# Patient Record
Sex: MALE | Race: WHITE | NOT HISPANIC OR LATINO | Employment: OTHER | RURAL
[De-identification: names, ages, dates, MRNs, and addresses within clinical notes are randomized per-mention and may not be internally consistent; named-entity substitution may affect disease eponyms.]

---

## 2021-07-03 ENCOUNTER — OFFICE VISIT (OUTPATIENT)
Dept: FAMILY MEDICINE | Facility: CLINIC | Age: 71
End: 2021-07-03
Payer: MEDICARE

## 2021-07-03 VITALS
TEMPERATURE: 97 F | OXYGEN SATURATION: 95 % | HEART RATE: 54 BPM | SYSTOLIC BLOOD PRESSURE: 159 MMHG | DIASTOLIC BLOOD PRESSURE: 89 MMHG | HEIGHT: 67 IN | BODY MASS INDEX: 28.25 KG/M2 | WEIGHT: 180 LBS | RESPIRATION RATE: 18 BRPM

## 2021-07-03 DIAGNOSIS — J20.9 ACUTE BRONCHITIS, UNSPECIFIED ORGANISM: Primary | ICD-10-CM

## 2021-07-03 PROCEDURE — 99213 OFFICE O/P EST LOW 20 MIN: CPT | Mod: 25,,, | Performed by: FAMILY MEDICINE

## 2021-07-03 PROCEDURE — 96372 PR INJECTION,THERAP/PROPH/DIAG2ST, IM OR SUBCUT: ICD-10-PCS | Mod: ,,, | Performed by: FAMILY MEDICINE

## 2021-07-03 PROCEDURE — 99051 PR MEDICAL SERVICES, EVE/WKEND/HOLIDAY: ICD-10-PCS | Mod: ,,, | Performed by: FAMILY MEDICINE

## 2021-07-03 PROCEDURE — 99051 MED SERV EVE/WKEND/HOLIDAY: CPT | Mod: ,,, | Performed by: FAMILY MEDICINE

## 2021-07-03 PROCEDURE — 96372 THER/PROPH/DIAG INJ SC/IM: CPT | Mod: ,,, | Performed by: FAMILY MEDICINE

## 2021-07-03 PROCEDURE — 99213 PR OFFICE/OUTPT VISIT, EST, LEVL III, 20-29 MIN: ICD-10-PCS | Mod: 25,,, | Performed by: FAMILY MEDICINE

## 2021-07-03 RX ORDER — PREDNISONE 20 MG/1
TABLET ORAL
Qty: 10 TABLET | Refills: 0 | Status: SHIPPED | OUTPATIENT
Start: 2021-07-03 | End: 2022-09-08

## 2021-07-03 RX ORDER — AZITHROMYCIN 250 MG/1
TABLET, FILM COATED ORAL
Qty: 6 TABLET | Refills: 0 | Status: SHIPPED | OUTPATIENT
Start: 2021-07-03 | End: 2022-09-08

## 2021-07-03 RX ORDER — DEXAMETHASONE SODIUM PHOSPHATE 4 MG/ML
6 INJECTION, SOLUTION INTRA-ARTICULAR; INTRALESIONAL; INTRAMUSCULAR; INTRAVENOUS; SOFT TISSUE
Status: COMPLETED | OUTPATIENT
Start: 2021-07-03 | End: 2021-07-03

## 2021-07-03 RX ADMIN — DEXAMETHASONE SODIUM PHOSPHATE 6 MG: 4 INJECTION, SOLUTION INTRA-ARTICULAR; INTRALESIONAL; INTRAMUSCULAR; INTRAVENOUS; SOFT TISSUE at 10:07

## 2021-07-07 ENCOUNTER — HOSPITAL ENCOUNTER (OUTPATIENT)
Dept: RADIOLOGY | Facility: HOSPITAL | Age: 71
Discharge: HOME | End: 2021-07-07
Attending: INTERNAL MEDICINE
Payer: MEDICARE

## 2021-07-07 ENCOUNTER — OFFICE VISIT (OUTPATIENT)
Dept: INTERNAL MEDICINE | Facility: CLINIC | Age: 71
End: 2021-07-07
Payer: MEDICARE

## 2021-07-07 VITALS
BODY MASS INDEX: 26.51 KG/M2 | DIASTOLIC BLOOD PRESSURE: 84 MMHG | HEIGHT: 69 IN | RESPIRATION RATE: 18 BRPM | SYSTOLIC BLOOD PRESSURE: 126 MMHG | HEART RATE: 51 BPM | WEIGHT: 179 LBS | OXYGEN SATURATION: 94 %

## 2021-07-07 DIAGNOSIS — R05.9 COUGH: ICD-10-CM

## 2021-07-07 DIAGNOSIS — Z76.89 ENCOUNTER TO ESTABLISH CARE WITH NEW DOCTOR: Primary | ICD-10-CM

## 2021-07-07 DIAGNOSIS — R06.2 WHEEZING: ICD-10-CM

## 2021-07-07 DIAGNOSIS — K21.9 GASTROESOPHAGEAL REFLUX DISEASE, UNSPECIFIED WHETHER ESOPHAGITIS PRESENT: ICD-10-CM

## 2021-07-07 DIAGNOSIS — R68.83 CHILLS: ICD-10-CM

## 2021-07-07 PROCEDURE — 99204 PR OFFICE/OUTPT VISIT, NEW, LEVL IV, 45-59 MIN: ICD-10-PCS | Mod: S$PBB,,, | Performed by: INTERNAL MEDICINE

## 2021-07-07 PROCEDURE — 99204 OFFICE O/P NEW MOD 45 MIN: CPT | Mod: S$PBB,,, | Performed by: INTERNAL MEDICINE

## 2021-07-07 PROCEDURE — 71046 XR CHEST PA AND LATERAL: ICD-10-PCS | Mod: 26,,, | Performed by: RADIOLOGY

## 2021-07-07 PROCEDURE — 71046 X-RAY EXAM CHEST 2 VIEWS: CPT | Mod: 26,,, | Performed by: RADIOLOGY

## 2021-07-07 PROCEDURE — 71046 X-RAY EXAM CHEST 2 VIEWS: CPT | Mod: TC

## 2021-07-07 PROCEDURE — 99214 OFFICE O/P EST MOD 30 MIN: CPT | Mod: PBBFAC,25 | Performed by: INTERNAL MEDICINE

## 2021-07-07 RX ORDER — HYDROCODONE POLISTIREX AND CHLORPHENIRAMINE POLISTIREX 10; 8 MG/5ML; MG/5ML
5 SUSPENSION, EXTENDED RELEASE ORAL EVERY 12 HOURS PRN
Qty: 115 ML | Refills: 0 | Status: SHIPPED | OUTPATIENT
Start: 2021-07-07 | End: 2022-09-08

## 2021-07-07 RX ORDER — ESOMEPRAZOLE MAGNESIUM 40 MG/1
40 CAPSULE, DELAYED RELEASE ORAL
Qty: 90 CAPSULE | Refills: 3 | Status: SHIPPED | OUTPATIENT
Start: 2021-07-07 | End: 2022-07-07

## 2021-07-07 RX ORDER — ALBUTEROL SULFATE 90 UG/1
2 AEROSOL, METERED RESPIRATORY (INHALATION) EVERY 6 HOURS PRN
Qty: 18 G | Refills: 1 | Status: SHIPPED | OUTPATIENT
Start: 2021-07-07 | End: 2022-09-08

## 2021-07-08 DIAGNOSIS — Z11.9 SCREENING EXAMINATION FOR INFECTIOUS DISEASE: Primary | ICD-10-CM

## 2021-08-30 ENCOUNTER — OFFICE VISIT (OUTPATIENT)
Dept: INTERNAL MEDICINE | Facility: CLINIC | Age: 71
End: 2021-08-30
Payer: MEDICARE

## 2021-08-30 VITALS
WEIGHT: 186 LBS | HEART RATE: 52 BPM | BODY MASS INDEX: 27.55 KG/M2 | SYSTOLIC BLOOD PRESSURE: 158 MMHG | OXYGEN SATURATION: 97 % | DIASTOLIC BLOOD PRESSURE: 80 MMHG | HEIGHT: 69 IN | RESPIRATION RATE: 16 BRPM

## 2021-08-30 DIAGNOSIS — H91.90 HEARING LOSS, UNSPECIFIED HEARING LOSS TYPE, UNSPECIFIED LATERALITY: ICD-10-CM

## 2021-08-30 DIAGNOSIS — N40.0 BENIGN PROSTATIC HYPERPLASIA, UNSPECIFIED WHETHER LOWER URINARY TRACT SYMPTOMS PRESENT: ICD-10-CM

## 2021-08-30 DIAGNOSIS — R03.0 ELEVATED BLOOD PRESSURE READING: ICD-10-CM

## 2021-08-30 DIAGNOSIS — Z09 FOLLOW UP: Primary | ICD-10-CM

## 2021-08-30 PROCEDURE — 99214 OFFICE O/P EST MOD 30 MIN: CPT | Mod: S$PBB,,, | Performed by: INTERNAL MEDICINE

## 2021-08-30 PROCEDURE — 99215 OFFICE O/P EST HI 40 MIN: CPT | Mod: PBBFAC | Performed by: INTERNAL MEDICINE

## 2021-08-30 PROCEDURE — 99214 PR OFFICE/OUTPT VISIT, EST, LEVL IV, 30-39 MIN: ICD-10-PCS | Mod: S$PBB,,, | Performed by: INTERNAL MEDICINE

## 2021-10-20 ENCOUNTER — OFFICE VISIT (OUTPATIENT)
Dept: OTOLARYNGOLOGY | Facility: CLINIC | Age: 71
End: 2021-10-20
Payer: MEDICARE

## 2021-10-20 ENCOUNTER — CLINICAL SUPPORT (OUTPATIENT)
Dept: AUDIOLOGY | Facility: CLINIC | Age: 71
End: 2021-10-20
Payer: MEDICARE

## 2021-10-20 VITALS — HEIGHT: 69 IN | BODY MASS INDEX: 27.55 KG/M2 | WEIGHT: 186 LBS

## 2021-10-20 DIAGNOSIS — H90.3 SENSORINEURAL HEARING LOSS (SNHL) OF BOTH EARS: Primary | ICD-10-CM

## 2021-10-20 DIAGNOSIS — H91.90 HEARING LOSS, UNSPECIFIED HEARING LOSS TYPE, UNSPECIFIED LATERALITY: ICD-10-CM

## 2021-10-20 PROCEDURE — 92557 COMPREHENSIVE HEARING TEST: CPT | Mod: PBBFAC | Performed by: AUDIOLOGIST

## 2021-10-20 PROCEDURE — 99212 OFFICE O/P EST SF 10 MIN: CPT | Mod: PBBFAC | Performed by: AUDIOLOGIST

## 2021-10-20 PROCEDURE — 99204 OFFICE O/P NEW MOD 45 MIN: CPT | Mod: S$PBB,,, | Performed by: OTOLARYNGOLOGY

## 2021-10-20 PROCEDURE — 99204 PR OFFICE/OUTPT VISIT, NEW, LEVL IV, 45-59 MIN: ICD-10-PCS | Mod: S$PBB,,, | Performed by: OTOLARYNGOLOGY

## 2021-10-20 PROCEDURE — 99213 OFFICE O/P EST LOW 20 MIN: CPT | Mod: PBBFAC | Performed by: OTOLARYNGOLOGY

## 2021-11-15 ENCOUNTER — OFFICE VISIT (OUTPATIENT)
Dept: UROLOGY | Facility: CLINIC | Age: 71
End: 2021-11-15
Payer: MEDICARE

## 2021-11-15 VITALS
WEIGHT: 190 LBS | BODY MASS INDEX: 28.14 KG/M2 | SYSTOLIC BLOOD PRESSURE: 139 MMHG | DIASTOLIC BLOOD PRESSURE: 84 MMHG | HEIGHT: 69 IN | HEART RATE: 67 BPM

## 2021-11-15 DIAGNOSIS — R97.20 ELEVATED PSA: Primary | ICD-10-CM

## 2021-11-15 DIAGNOSIS — N41.1 CHRONIC PROSTATITIS: ICD-10-CM

## 2021-11-15 DIAGNOSIS — N40.1 BENIGN PROSTATIC HYPERPLASIA WITH URINARY OBSTRUCTION: ICD-10-CM

## 2021-11-15 DIAGNOSIS — R97.20 ELEVATED PROSTATE SPECIFIC ANTIGEN (PSA): Primary | ICD-10-CM

## 2021-11-15 DIAGNOSIS — N32.0 BLADDER NECK OBSTRUCTION: ICD-10-CM

## 2021-11-15 DIAGNOSIS — N13.8 BENIGN PROSTATIC HYPERPLASIA WITH URINARY OBSTRUCTION: ICD-10-CM

## 2021-11-15 PROCEDURE — 99213 OFFICE O/P EST LOW 20 MIN: CPT | Mod: S$PBB,,, | Performed by: UROLOGY

## 2021-11-15 PROCEDURE — 99213 OFFICE O/P EST LOW 20 MIN: CPT | Mod: PBBFAC | Performed by: UROLOGY

## 2021-11-15 PROCEDURE — 99213 PR OFFICE/OUTPT VISIT, EST, LEVL III, 20-29 MIN: ICD-10-PCS | Mod: S$PBB,,, | Performed by: UROLOGY

## 2021-11-18 DIAGNOSIS — R97.20 ELEVATED PSA: Primary | ICD-10-CM

## 2021-11-18 DIAGNOSIS — Z01.812 PRE-PROCEDURE LAB EXAM: ICD-10-CM

## 2021-12-09 ENCOUNTER — OFFICE VISIT (OUTPATIENT)
Dept: UROLOGY | Facility: CLINIC | Age: 71
End: 2021-12-09
Payer: MEDICARE

## 2021-12-09 VITALS
SYSTOLIC BLOOD PRESSURE: 159 MMHG | DIASTOLIC BLOOD PRESSURE: 84 MMHG | WEIGHT: 188 LBS | BODY MASS INDEX: 27.85 KG/M2 | HEART RATE: 55 BPM | HEIGHT: 69 IN

## 2021-12-09 DIAGNOSIS — N13.8 BENIGN PROSTATIC HYPERPLASIA WITH URINARY OBSTRUCTION: ICD-10-CM

## 2021-12-09 DIAGNOSIS — N41.1 CHRONIC PROSTATITIS: ICD-10-CM

## 2021-12-09 DIAGNOSIS — R97.20 ELEVATED PSA: Primary | ICD-10-CM

## 2021-12-09 DIAGNOSIS — N40.1 BENIGN PROSTATIC HYPERPLASIA WITH URINARY OBSTRUCTION: ICD-10-CM

## 2021-12-09 PROCEDURE — 99211 PR OFFICE/OUTPT VISIT, EST, LEVL I: ICD-10-PCS | Mod: S$PBB,,, | Performed by: UROLOGY

## 2021-12-09 PROCEDURE — 99211 OFF/OP EST MAY X REQ PHY/QHP: CPT | Mod: S$PBB,,, | Performed by: UROLOGY

## 2021-12-09 PROCEDURE — 99214 OFFICE O/P EST MOD 30 MIN: CPT | Mod: PBBFAC | Performed by: UROLOGY

## 2021-12-14 ENCOUNTER — HOSPITAL ENCOUNTER (OUTPATIENT)
Dept: RADIOLOGY | Facility: HOSPITAL | Age: 71
Discharge: HOME | End: 2021-12-14
Attending: ORTHOPAEDIC SURGERY
Payer: MEDICARE

## 2021-12-14 DIAGNOSIS — M79.641 PAIN IN BOTH HANDS: ICD-10-CM

## 2021-12-14 DIAGNOSIS — M79.642 PAIN IN BOTH HANDS: ICD-10-CM

## 2021-12-14 PROCEDURE — 73130 X-RAY EXAM OF HAND: CPT | Mod: TC,50

## 2022-03-11 DIAGNOSIS — Z71.89 COMPLEX CARE COORDINATION: ICD-10-CM

## 2022-06-13 ENCOUNTER — OFFICE VISIT (OUTPATIENT)
Dept: UROLOGY | Facility: CLINIC | Age: 72
End: 2022-06-13
Payer: MEDICARE

## 2022-06-13 VITALS
WEIGHT: 190 LBS | BODY MASS INDEX: 28.14 KG/M2 | HEART RATE: 51 BPM | SYSTOLIC BLOOD PRESSURE: 160 MMHG | DIASTOLIC BLOOD PRESSURE: 78 MMHG | HEIGHT: 69 IN

## 2022-06-13 DIAGNOSIS — R97.20 ELEVATED PSA: Primary | ICD-10-CM

## 2022-06-13 DIAGNOSIS — N41.1 CHRONIC PROSTATITIS: ICD-10-CM

## 2022-06-13 DIAGNOSIS — N40.1 BENIGN PROSTATIC HYPERPLASIA WITH URINARY OBSTRUCTION: ICD-10-CM

## 2022-06-13 DIAGNOSIS — N32.0 BLADDER NECK OBSTRUCTION: ICD-10-CM

## 2022-06-13 DIAGNOSIS — N13.8 BENIGN PROSTATIC HYPERPLASIA WITH URINARY OBSTRUCTION: ICD-10-CM

## 2022-06-13 DIAGNOSIS — Z77.098 EXPOSURE TO HAZARDOUS CHEMICAL: ICD-10-CM

## 2022-06-13 PROCEDURE — 99213 PR OFFICE/OUTPT VISIT, EST, LEVL III, 20-29 MIN: ICD-10-PCS | Mod: S$PBB,,, | Performed by: UROLOGY

## 2022-06-13 PROCEDURE — 99213 OFFICE O/P EST LOW 20 MIN: CPT | Mod: S$PBB,,, | Performed by: UROLOGY

## 2022-06-13 PROCEDURE — 99214 OFFICE O/P EST MOD 30 MIN: CPT | Mod: PBBFAC | Performed by: UROLOGY

## 2022-06-13 RX ORDER — IBUPROFEN 400 MG/1
400 TABLET ORAL NIGHTLY PRN
COMMUNITY

## 2022-06-13 RX ORDER — DIPHENHYDRAMINE HCL 25 MG
25 TABLET ORAL NIGHTLY PRN
COMMUNITY

## 2022-06-13 NOTE — PROGRESS NOTES
Subjective:       Patient ID: Praful Esposito III is a 72 y.o. male.    Chief Complaint: Follow-up (6 month follow up, PSA elevation)      History of Present Illness  Mr. Esposito is now 64 years old. He is seen for the first time as a clinic patient in nearly 5 years. He has been seen at yearly screens and has had PSAs done virtually every year. He has a relatively high normal PSA. He has had some increase in lower tract obstructive symptoms. He does not void as well compared to what he used to. He has not on any regular medications and does not feel he needs anything yet to help with the urine flow. No new health issues that he is aware of but he has not had any blood work in several years and desires general check up also. Does not have primary physician currently but has used Dr. Gonzalez in the past.  (April 1, 2015)     Mr. Esposito's PSA has continued to go up. It was 4.71 at recent prostate screening in September. We had recommended that he have prostate biopsies and he comes in today to have these done. Clinically unchanged. He does have mild-to-moderate lower tract obstructive symptoms.  (November 19 2015)     Mr. Esposito is in for follow-up of PSA elevation. 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 Prostate size was 50 mL. He has not been having a good stream and has trouble emptying and has slowing of his stream. He desires no medication to help with that at this time. He has had a good 6 months overall. (July 26, 2016)     Mr. Esposito is in for his 6 month follow-up for PSA elevation. Biopsies slightly over a year ago did not show any suggestion of cancer but did have high-grade PIN and continued follow-up indicated. He is doing okay clinically with no worsening bladder outlet obstructive symptoms. PSA is down slightly to 4.460.  (January 31, 2017)     Mr. Esposito is in for 6 month follow-up of chronic PSA elevation. Clinically doing okay with no worsening  bladder outlet obstructive symptoms or other difficulty. (August 1, 2017)     Mr. Esposito had his MRI of the prostate done on September 12, 2017. The size of the gland was 61 mL. The patient had  no suspicious lesions identified and no biopsy was felt to be indicated. Returned today for follow-up PSA and recheck. He's had no significant increase in voiding symptoms with relatively mild bladder outlet obstructive problems. No new health issues. Recently retired from [employer]. (February 6, 2018)     Mr. Esposito is in for his 6 month follow-up for PSA elevation. Clinically doing okay without any worsening bladder outlet obstructive symptoms. Typically nocturia x1. He takes no medications. Overall satisfactory 6 months. (August 7, 2018)     Mr. Esposito is in for first time in a year. He missed his last appointment because he was getting over back surgery that he had in December. He had that done in [facility]. He is doing fairly well with voiding with some slight hesitancy and usually nocturia x1. He does not want any pharmacologic help with the way he is voiding. (October 31, 2019)     Mr. Esposito is in for first visit in a year. He has had chronic PSA elevation for several years. He's had previous biopsies and had previous MRI of the prostate done at [facility]. Clinically doing okay with nocturia one to 2 times nightly. He's had no new health problems this year feels he is stable clinically. (November 9, 2020)     PSA was 7.510 on 12/14/2020  PSA was 7.810 on November 9, 2020  PSA was 5.740 on October 31, 2019   PSA was 6.280 on  August 7, 2018  PSA was 5.050 on February 6, 2018  PSA was 6.650 on August 1, 2017  PSA was 4.460 on 1/31/2017  PSA was 4.970 on July 26, 2016  PSA was 4.71 on September 22, 2015  PSA was 3.630 on April 1, 2015, Past PSA Results   PSA was 2.89 on September 13, 2011  2.03 on September 22, 2009  2.54 on September 23, 2008  1.88 on September 18, 2007  2.59 on September 19, 2006  1.92 on September  20, 2005  1.3 on September 16, 2003  1.5 on September 27, 2000  -------------------------------------------------------------------------------------------------------------------------------------------------------------------------------------------------------------------  -The above notes are from the old electronic medical record--------------------------------------------------------------------     PSA was 9.130 on November 16, 2021  PSA was 8.640 on June 13, 2022     Mr. Esposito is in for 1st visit in a year.  He has chronic PSA elevation.  PSA pending while he was in the office.  He feels he is stable with no change with nocturia only 1 time nightly.  He has been followed for PSA elevation for over 10 years.  No worsening bladder outlet obstructive symptoms.  Typically nocturia only 1 time nightly like he has had for the last few years.  He had a MRI of prostate at [facility] in September 2017. Had biopsies done in 2015. General health has been good and he has had no other health issues. (November 15, 2021)          Mr. Esposito  was in for his MRI of the prostate and follow-up of his PSA elevation that had actually gone to the highest level it has ever been on his PSA done last month.  No worsening bladder outlet obstructive symptoms. (December 9, 2021)     Mr. Esposito is in for his 6 month follow-up of chronic PSA elevation.  He feels he is stable with voiding with nocturia x1 on no medications.  MRI was a PI-RADS 2 with 57 mL prostate.  He does not feel he needs any help with voiding at present and has had a good 6 months.   PSA drawn and pending.  (June 13, 2022)      Review of Systems      Objective:      Physical Exam  Constitutional:       Appearance: Normal appearance. He is normal weight.   Genitourinary:     Prostate: Normal.      Rectum: Normal.      Comments: Prostate gland feels 30-40 g smooth symmetrical and not suspicious  Neurological:      Mental Status: He is alert.   Psychiatric:         Mood and  Affect: Mood normal.         Behavior: Behavior normal.       urinalysis unremarkable with occasional pus cells.  Dipstick has a trace of blood pH 5.0 and specific gravity 1.020  Assessment:       Problem List Items Addressed This Visit        Renal/    Benign prostatic hyperplasia      Other Visit Diagnoses     Elevated PSA    -  Primary    Relevant Orders    PSA, Total (Diagnostic)    Exposure to hazardous chemical        Relevant Orders    Testosterone    Estradiol    Chronic prostatitis        Bladder neck obstruction              Plan:     Mr. Esposito desires no help with the way he is voiding currently.  PSA returned after he left and is down slightly to 8.640.  He was notified of results by telephone.  Six month appointment with PSA or sooner for problems.  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  *

## 2022-06-14 NOTE — PATIENT INSTRUCTIONS
Mr. Esposito desires no help with the way he is voiding currently.  PSA returned after he left and is down slightly to 8.640.  He was notified of results by telephone.  Six month appointment with PSA or sooner for problems.  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

## 2022-07-31 ENCOUNTER — EXTERNAL CHRONIC CARE MANAGEMENT (OUTPATIENT)
Dept: INTERNAL MEDICINE | Facility: CLINIC | Age: 72
End: 2022-07-31
Payer: MEDICARE

## 2022-07-31 PROCEDURE — 99490 CHRNC CARE MGMT STAFF 1ST 20: CPT | Mod: S$PBB,,, | Performed by: INTERNAL MEDICINE

## 2022-07-31 PROCEDURE — 99490 PR CHRONIC CARE MGMT, 1ST 20 MIN: ICD-10-PCS | Mod: S$PBB,,, | Performed by: INTERNAL MEDICINE

## 2022-07-31 PROCEDURE — 99490 CHRNC CARE MGMT STAFF 1ST 20: CPT | Mod: PBBFAC | Performed by: INTERNAL MEDICINE

## 2022-08-31 ENCOUNTER — EXTERNAL CHRONIC CARE MANAGEMENT (OUTPATIENT)
Dept: INTERNAL MEDICINE | Facility: CLINIC | Age: 72
End: 2022-08-31
Payer: MEDICARE

## 2022-08-31 PROCEDURE — 99490 CHRNC CARE MGMT STAFF 1ST 20: CPT | Mod: PBBFAC | Performed by: INTERNAL MEDICINE

## 2022-08-31 PROCEDURE — 99490 CHRNC CARE MGMT STAFF 1ST 20: CPT | Mod: S$PBB,,, | Performed by: INTERNAL MEDICINE

## 2022-08-31 PROCEDURE — 99490 PR CHRONIC CARE MGMT, 1ST 20 MIN: ICD-10-PCS | Mod: S$PBB,,, | Performed by: INTERNAL MEDICINE

## 2022-09-08 ENCOUNTER — OFFICE VISIT (OUTPATIENT)
Dept: INTERNAL MEDICINE | Facility: CLINIC | Age: 72
End: 2022-09-08
Payer: MEDICARE

## 2022-09-08 VITALS
DIASTOLIC BLOOD PRESSURE: 70 MMHG | HEIGHT: 69 IN | OXYGEN SATURATION: 98 % | WEIGHT: 186 LBS | SYSTOLIC BLOOD PRESSURE: 160 MMHG | HEART RATE: 61 BPM | BODY MASS INDEX: 27.55 KG/M2 | RESPIRATION RATE: 20 BRPM

## 2022-09-08 DIAGNOSIS — Z09 FOLLOW UP: Primary | ICD-10-CM

## 2022-09-08 DIAGNOSIS — R03.0 ELEVATED BLOOD PRESSURE READING: ICD-10-CM

## 2022-09-08 DIAGNOSIS — N40.0 BENIGN PROSTATIC HYPERPLASIA, UNSPECIFIED WHETHER LOWER URINARY TRACT SYMPTOMS PRESENT: ICD-10-CM

## 2022-09-08 DIAGNOSIS — H91.90 HEARING LOSS, UNSPECIFIED HEARING LOSS TYPE, UNSPECIFIED LATERALITY: ICD-10-CM

## 2022-09-08 DIAGNOSIS — I10 ESSENTIAL (PRIMARY) HYPERTENSION: ICD-10-CM

## 2022-09-08 DIAGNOSIS — K21.9 GASTROESOPHAGEAL REFLUX DISEASE, UNSPECIFIED WHETHER ESOPHAGITIS PRESENT: ICD-10-CM

## 2022-09-08 PROCEDURE — 99214 PR OFFICE/OUTPT VISIT, EST, LEVL IV, 30-39 MIN: ICD-10-PCS | Mod: S$PBB,,, | Performed by: INTERNAL MEDICINE

## 2022-09-08 PROCEDURE — 99214 OFFICE O/P EST MOD 30 MIN: CPT | Mod: S$PBB,,, | Performed by: INTERNAL MEDICINE

## 2022-09-08 PROCEDURE — 99214 OFFICE O/P EST MOD 30 MIN: CPT | Mod: PBBFAC | Performed by: INTERNAL MEDICINE

## 2022-09-08 RX ORDER — HYDROGEN PEROXIDE 3 %
20 SOLUTION, NON-ORAL MISCELLANEOUS
Qty: 90 CAPSULE | Refills: 3 | Status: SHIPPED | OUTPATIENT
Start: 2022-09-08 | End: 2022-09-08

## 2022-09-08 RX ORDER — DICLOFENAC SODIUM 30 MG/G
GEL TOPICAL 2 TIMES DAILY
COMMUNITY
End: 2023-09-11 | Stop reason: SDUPTHER

## 2022-09-08 RX ORDER — ESOMEPRAZOLE MAGNESIUM 40 MG/1
40 CAPSULE, DELAYED RELEASE ORAL
Qty: 90 CAPSULE | Refills: 3 | Status: SHIPPED | OUTPATIENT
Start: 2022-09-08 | End: 2023-03-02 | Stop reason: SDUPTHER

## 2022-09-08 NOTE — PROGRESS NOTES
Subjective:       Patient ID: Praful Esposito III is a 72 y.o. male.    Chief Complaint: Follow-up (C/o LISBETH leg pain and has been using voltaren gel. )    The patient is a 71-year-old white male the presents today for follow-up.  History of GERD and BPH.  At his last visit he complained of cough and wheezing.  Chest x-ray showed no acute process.  COVID swab was negative.  We prescribed him a Ventolin inhaler and his cough has resolved.  No complaints today.  His systolic blood pressure is  Elevated.  No history of hypertension.  He is currently resting comfortably in no distress.  He is afebrile and other than systolic blood pressure vital signs are stable.  He states systolic blood pressure normally runs in the 130s to 140s.    9/8-the patient presents today for follow-up.  No further issues with cough.  His blood pressure is elevated again today at 160/70.  We had him keep a blood pressure log last time and blood pressures were at target.  He forgot his blood pressure log that he was going to bring in with him today.  He states that systolics have been running in the 130s and 140s.  He complains of some bilateral groin pain that happened after doing some stretching.  No numbness or tingling.  States that he also had what looked like acne on his face that comes and goes.  He has an appointment with Dermatology in October.  Since it has been greater than 10 years since he has had a colonoscopy.  His father had colon cancer.  He also needs refill on his Nexium.  He is resting comfortably today in no distress.  Outside of the blood pressure his vital signs are stable.    Follow-up  Associated symptoms include arthralgias. Pertinent negatives include no abdominal pain, chest pain, chills, coughing, fatigue, fever, headaches, joint swelling, myalgias, nausea, neck pain, rash, sore throat, vomiting or weakness.   Review of Systems   Constitutional:  Negative for activity change, appetite change, chills, fatigue, fever and  unexpected weight change.   HENT:  Negative for ear pain, hearing loss, rhinorrhea, sinus pressure/congestion, sore throat and trouble swallowing.    Eyes:  Negative for pain, discharge, redness and visual disturbance.   Respiratory:  Negative for apnea, cough, chest tightness, shortness of breath and wheezing.    Cardiovascular:  Negative for chest pain, palpitations and leg swelling.   Gastrointestinal:  Negative for abdominal pain, blood in stool, constipation, diarrhea, nausea and vomiting.   Endocrine: Negative for cold intolerance, heat intolerance, polydipsia and polyuria.   Genitourinary:  Negative for difficulty urinating, dysuria, hematuria and urgency.   Musculoskeletal:  Positive for arthralgias. Negative for back pain, joint swelling, myalgias and neck pain.   Integumentary:  Negative for pallor and rash.   Allergic/Immunologic: Negative for frequent infections.   Neurological:  Negative for tremors, seizures, weakness and headaches.   Hematological:  Negative for adenopathy.   Psychiatric/Behavioral:  Negative for confusion, dysphoric mood and sleep disturbance. The patient is not nervous/anxious.        Objective:      Physical Exam  Vitals and nursing note reviewed.   Constitutional:       General: He is not in acute distress.     Appearance: Normal appearance. He is not ill-appearing.   HENT:      Head: Normocephalic and atraumatic.      Right Ear: External ear normal.      Left Ear: External ear normal.      Nose: Nose normal.      Mouth/Throat:      Pharynx: Oropharynx is clear.   Eyes:      Extraocular Movements: Extraocular movements intact.      Conjunctiva/sclera: Conjunctivae normal.      Pupils: Pupils are equal, round, and reactive to light.   Neck:      Vascular: No carotid bruit.   Cardiovascular:      Rate and Rhythm: Normal rate and regular rhythm.      Pulses: Normal pulses.      Heart sounds: Normal heart sounds. No murmur heard.  Pulmonary:      Effort: No respiratory distress.       Breath sounds: No wheezing or rales.   Abdominal:      General: Bowel sounds are normal. There is no distension.      Palpations: Abdomen is soft.   Musculoskeletal:         General: Normal range of motion.      Cervical back: Normal range of motion and neck supple.      Right lower leg: No edema.      Left lower leg: No edema.   Skin:     General: Skin is warm and dry.      Capillary Refill: Capillary refill takes less than 2 seconds.      Coloration: Skin is not pale.   Neurological:      General: No focal deficit present.      Mental Status: He is alert and oriented to person, place, and time.      Cranial Nerves: No cranial nerve deficit.      Sensory: No sensory deficit.      Motor: No weakness.   Psychiatric:         Mood and Affect: Mood normal.         Judgment: Judgment normal.       Assessment:       1. Follow up    2. Hearing loss, unspecified hearing loss type, unspecified laterality    3. Elevated blood pressure reading    4. Benign prostatic hyperplasia, unspecified whether lower urinary tract symptoms present    5. Gastroesophageal reflux disease, unspecified whether esophagitis present    6. Essential (primary) hypertension        Plan:       1. The patient presents today for follow-up.  We are going to check some routine lab work today.  We will also make a referral to GI for colonoscopy.  It has been greater than 10 years.  He has this bilateral groin pain following some stretching that he did.  He has a topical solution that he is using right now that helps.  I have offered does make a referral to physical therapy but he would like to hold off at this time.    2. Hearing loss-we will make a referral to audiology for a formal hearing test  9/8-no acute issues.  According to his wife he did have the testing done    3. Elevated blood pressure reading-no diagnosis hypertension.  Systolic blood pressure 160 today.  He states that it normally runs in the 130s to 140s.  His last blood pressure log shows  systolics in the 130s to 140s.  I think he likely has a component of white coat hypertension.  He left his most recent blood pressure log at home but will get that back to us.    4. BPH-stable.  PSA 8.64 down from 9.13.  He follows with Urology    5. GERD-stable on Nexium    6. Rash-comes and goes.  On the face.  Rosacea?  He has an appointment scheduled with Dermatology in October.    Return to care in 6 months or sooner if needed

## 2022-09-22 ENCOUNTER — TELEPHONE (OUTPATIENT)
Dept: INTERNAL MEDICINE | Facility: CLINIC | Age: 72
End: 2022-09-22
Payer: MEDICARE

## 2022-09-22 DIAGNOSIS — M25.521 RIGHT ELBOW PAIN: ICD-10-CM

## 2022-09-22 DIAGNOSIS — M79.606 PAIN OF LOWER EXTREMITY, UNSPECIFIED LATERALITY: ICD-10-CM

## 2022-09-22 DIAGNOSIS — M79.641 PAIN OF RIGHT HAND: Primary | ICD-10-CM

## 2022-09-22 DIAGNOSIS — R20.0 NUMBNESS OF FINGER: ICD-10-CM

## 2022-09-22 NOTE — TELEPHONE ENCOUNTER
Pt called with c/o right elbow pain and finger going numb, and having to use two hands to open a drink etc. After speaking with Dr. De La Rosa we are referring pt to ortho. Pt was advised to take ibuprofen as needed for pain. He verbalized understanding.

## 2022-10-06 ENCOUNTER — HOSPITAL ENCOUNTER (OUTPATIENT)
Dept: RADIOLOGY | Facility: HOSPITAL | Age: 72
Discharge: HOME | End: 2022-10-06
Attending: ORTHOPAEDIC SURGERY
Payer: MEDICARE

## 2022-10-06 DIAGNOSIS — M79.641 HAND PAIN, RIGHT: ICD-10-CM

## 2022-10-06 PROCEDURE — 73130 X-RAY EXAM OF HAND: CPT | Mod: TC,RT

## 2022-10-06 NOTE — H&P (VIEW-ONLY)
CC:   Chief Complaint   Patient presents with    Right Hand - Pain, Numbness     REF BY DR. EASON        PREVIOUS INFO:        HISTORY:   10/6/2022    Praful Esposito III  is a 72 y.o. comes in for least a month having significant right arm numbness and tingling pretty much all the time we tries to sleep particular goes to sleep driving he has had symptoms longer than that but it has been worse recently the right arm was really affected he does have some left hand symptoms he denies neck pain he denies a trauma      PAST MEDICAL HISTORY:   Past Medical History:   Diagnosis Date    Acute bronchitis     Benign prostatic hyperplasia with lower urinary tract symptoms     Bladder neck obstruction     Chronic prostatitis     Dysplasia of prostate     Elevated PSA     Fatigue     Urinary tract infection           PAST SURGICAL HISTORY:   Past Surgical History:   Procedure Laterality Date    BACK SURGERY      TONSILLECTOMY      TRUS Biopsy of prostate            ALLERGIES: Review of patient's allergies indicates:  No Known Allergies     MEDICATIONS :    Current Outpatient Medications:     diclofenac sodium (SOLARAZE) 3 % gel, Apply topically 2 (two) times daily., Disp: , Rfl:     diphenhydrAMINE (SOMINEX) 25 mg tablet, Take 25 mg by mouth nightly as needed for Insomnia., Disp: , Rfl:     esomeprazole (NEXIUM) 40 MG capsule, Take 1 capsule (40 mg total) by mouth before breakfast., Disp: 90 capsule, Rfl: 3    ibuprofen (ADVIL,MOTRIN) 400 MG tablet, Take 400 mg by mouth nightly as needed for Other., Disp: , Rfl:     methocarbamol (ROBAXIN-750 ORAL), Take 2 tablets by mouth as needed., Disp: , Rfl:      SOCIAL HISTORY:   Social History     Socioeconomic History    Marital status:    Tobacco Use    Smoking status: Former     Packs/day: 0.50     Years: 3.00     Pack years: 1.50     Types: Cigarettes    Smokeless tobacco: Former     Types: Chew   Substance and Sexual Activity    Alcohol use: Not Currently      Comment: occassionaly    Drug use: Never    Sexual activity: Yes     Partners: Female     Birth control/protection: None        ROS    FAMILY HISTORY:   Family History   Problem Relation Age of Onset    Clotting disorder Other     Cancer Other     Dementia Other     Heart disease Other     Cancer Father           PHYSICAL EXAM: There were no vitals filed for this visit.            There is no height or weight on file to calculate BMI.     In general, this is a well-developed, well-nourished male . The patient is alert, oriented and cooperative.      HEENT:  Normocephalic, atraumatic.  Extraocular movements are intact bilaterally.  The oropharynx is benign.       NECK:  Nontender with good range of motion.      PULMONARY: Respirations are even and non-labored.       CARDIOVASCULAR: Pulses regular by peripheral palpation.       ABDOMEN:  Soft, non-tender, non-distended.        EXTREMITIES:  Start his neck flexion extension without symptoms compression does not cause pain either moves his shoulder well positive Phalen's at the hand    Ortho Exam      RADIOGRAPHIC FINDINGS:  Three views right hand AP lateral and oblique view significant degenerative change of the 1st metacarpal carpal joint no fracture dislocations appreciated      .      IMPRESSION:  Probable carpal tunnel I do not see any cervical issues on exam today    PLAN:  Night splint for the right upper extremity   EMG is bilateral upper extremities      No follow-ups on file.         Jose Pickard III      (Subject to voice recognition error, transcription service not allowed)

## 2022-10-24 ENCOUNTER — HOSPITAL ENCOUNTER (OUTPATIENT)
Facility: HOSPITAL | Age: 72
Discharge: HOME | End: 2022-10-24
Attending: ORTHOPAEDIC SURGERY | Admitting: ORTHOPAEDIC SURGERY
Payer: MEDICARE

## 2022-10-24 ENCOUNTER — ANESTHESIA EVENT (OUTPATIENT)
Dept: SURGERY | Facility: HOSPITAL | Age: 72
End: 2022-10-24
Payer: MEDICARE

## 2022-10-24 ENCOUNTER — ANESTHESIA (OUTPATIENT)
Dept: SURGERY | Facility: HOSPITAL | Age: 72
End: 2022-10-24
Payer: MEDICARE

## 2022-10-24 VITALS
OXYGEN SATURATION: 97 % | RESPIRATION RATE: 16 BRPM | SYSTOLIC BLOOD PRESSURE: 132 MMHG | WEIGHT: 181 LBS | HEIGHT: 69 IN | BODY MASS INDEX: 26.81 KG/M2 | DIASTOLIC BLOOD PRESSURE: 68 MMHG | HEART RATE: 72 BPM | TEMPERATURE: 99 F

## 2022-10-24 DIAGNOSIS — G56.01 CARPAL TUNNEL SYNDROME OF RIGHT WRIST: Primary | ICD-10-CM

## 2022-10-24 PROCEDURE — 27000177 HC AIRWAY, LARYNGEAL MASK: Performed by: NURSE ANESTHETIST, CERTIFIED REGISTERED

## 2022-10-24 PROCEDURE — 27000510 HC BLANKET BAIR HUGGER ANY SIZE: Performed by: NURSE ANESTHETIST, CERTIFIED REGISTERED

## 2022-10-24 PROCEDURE — 37000009 HC ANESTHESIA EA ADD 15 MINS: Performed by: ORTHOPAEDIC SURGERY

## 2022-10-24 PROCEDURE — 27000716 HC OXISENSOR PROBE, ANY SIZE: Performed by: NURSE ANESTHETIST, CERTIFIED REGISTERED

## 2022-10-24 PROCEDURE — 36000707: Performed by: ORTHOPAEDIC SURGERY

## 2022-10-24 PROCEDURE — 63600175 PHARM REV CODE 636 W HCPCS: Performed by: NURSE ANESTHETIST, CERTIFIED REGISTERED

## 2022-10-24 PROCEDURE — 71000015 HC POSTOP RECOV 1ST HR: Performed by: ORTHOPAEDIC SURGERY

## 2022-10-24 PROCEDURE — D9220A PRA ANESTHESIA: ICD-10-PCS | Mod: CRNA,,, | Performed by: NURSE ANESTHETIST, CERTIFIED REGISTERED

## 2022-10-24 PROCEDURE — 37000008 HC ANESTHESIA 1ST 15 MINUTES: Performed by: ORTHOPAEDIC SURGERY

## 2022-10-24 PROCEDURE — D9220A PRA ANESTHESIA: Mod: CRNA,,, | Performed by: NURSE ANESTHETIST, CERTIFIED REGISTERED

## 2022-10-24 PROCEDURE — D9220A PRA ANESTHESIA: Mod: ANES,,, | Performed by: ANESTHESIOLOGY

## 2022-10-24 PROCEDURE — 25000003 PHARM REV CODE 250: Performed by: NURSE ANESTHETIST, CERTIFIED REGISTERED

## 2022-10-24 PROCEDURE — 71000033 HC RECOVERY, INTIAL HOUR: Performed by: ORTHOPAEDIC SURGERY

## 2022-10-24 PROCEDURE — D9220A PRA ANESTHESIA: ICD-10-PCS | Mod: ANES,,, | Performed by: ANESTHESIOLOGY

## 2022-10-24 PROCEDURE — 36000706: Performed by: ORTHOPAEDIC SURGERY

## 2022-10-24 PROCEDURE — 25000003 PHARM REV CODE 250: Performed by: ORTHOPAEDIC SURGERY

## 2022-10-24 RX ORDER — OXYCODONE HYDROCHLORIDE 5 MG/1
5 TABLET ORAL
Status: DISCONTINUED | OUTPATIENT
Start: 2022-10-24 | End: 2022-10-24 | Stop reason: HOSPADM

## 2022-10-24 RX ORDER — PROPOFOL 10 MG/ML
VIAL (ML) INTRAVENOUS
Status: DISCONTINUED | OUTPATIENT
Start: 2022-10-24 | End: 2022-10-24

## 2022-10-24 RX ORDER — DIPHENHYDRAMINE HYDROCHLORIDE 50 MG/ML
25 INJECTION INTRAMUSCULAR; INTRAVENOUS EVERY 6 HOURS PRN
Status: DISCONTINUED | OUTPATIENT
Start: 2022-10-24 | End: 2022-10-24 | Stop reason: HOSPADM

## 2022-10-24 RX ORDER — ONDANSETRON 2 MG/ML
4 INJECTION INTRAMUSCULAR; INTRAVENOUS DAILY PRN
Status: DISCONTINUED | OUTPATIENT
Start: 2022-10-24 | End: 2022-10-24 | Stop reason: HOSPADM

## 2022-10-24 RX ORDER — MEPERIDINE HYDROCHLORIDE 25 MG/ML
25 INJECTION INTRAMUSCULAR; INTRAVENOUS; SUBCUTANEOUS EVERY 10 MIN PRN
Status: DISCONTINUED | OUTPATIENT
Start: 2022-10-24 | End: 2022-10-24 | Stop reason: HOSPADM

## 2022-10-24 RX ORDER — LIDOCAINE HYDROCHLORIDE 20 MG/ML
INJECTION, SOLUTION EPIDURAL; INFILTRATION; INTRACAUDAL; PERINEURAL
Status: DISCONTINUED | OUTPATIENT
Start: 2022-10-24 | End: 2022-10-24

## 2022-10-24 RX ORDER — HYDROMORPHONE HYDROCHLORIDE 2 MG/ML
0.5 INJECTION, SOLUTION INTRAMUSCULAR; INTRAVENOUS; SUBCUTANEOUS EVERY 5 MIN PRN
Status: DISCONTINUED | OUTPATIENT
Start: 2022-10-24 | End: 2022-10-24 | Stop reason: HOSPADM

## 2022-10-24 RX ORDER — ONDANSETRON 4 MG/1
8 TABLET, ORALLY DISINTEGRATING ORAL EVERY 8 HOURS PRN
Status: DISCONTINUED | OUTPATIENT
Start: 2022-10-24 | End: 2022-10-24 | Stop reason: HOSPADM

## 2022-10-24 RX ORDER — FENTANYL CITRATE 50 UG/ML
INJECTION, SOLUTION INTRAMUSCULAR; INTRAVENOUS
Status: DISCONTINUED | OUTPATIENT
Start: 2022-10-24 | End: 2022-10-24

## 2022-10-24 RX ORDER — HYDROCODONE BITARTRATE AND ACETAMINOPHEN 5; 325 MG/1; MG/1
1 TABLET ORAL EVERY 4 HOURS PRN
Status: DISCONTINUED | OUTPATIENT
Start: 2022-10-24 | End: 2022-10-24 | Stop reason: HOSPADM

## 2022-10-24 RX ORDER — CEFAZOLIN SODIUM 1 G/3ML
INJECTION, POWDER, FOR SOLUTION INTRAMUSCULAR; INTRAVENOUS
Status: DISCONTINUED | OUTPATIENT
Start: 2022-10-24 | End: 2022-10-24

## 2022-10-24 RX ORDER — MORPHINE SULFATE 10 MG/ML
4 INJECTION INTRAMUSCULAR; INTRAVENOUS; SUBCUTANEOUS EVERY 5 MIN PRN
Status: DISCONTINUED | OUTPATIENT
Start: 2022-10-24 | End: 2022-10-24 | Stop reason: HOSPADM

## 2022-10-24 RX ORDER — ONDANSETRON 2 MG/ML
INJECTION INTRAMUSCULAR; INTRAVENOUS
Status: DISCONTINUED | OUTPATIENT
Start: 2022-10-24 | End: 2022-10-24

## 2022-10-24 RX ORDER — BUPIVACAINE HYDROCHLORIDE 5 MG/ML
INJECTION, SOLUTION EPIDURAL; INTRACAUDAL
Status: DISCONTINUED | OUTPATIENT
Start: 2022-10-24 | End: 2022-10-24 | Stop reason: HOSPADM

## 2022-10-24 RX ORDER — SODIUM CHLORIDE, SODIUM LACTATE, POTASSIUM CHLORIDE, CALCIUM CHLORIDE 600; 310; 30; 20 MG/100ML; MG/100ML; MG/100ML; MG/100ML
INJECTION, SOLUTION INTRAVENOUS CONTINUOUS
Status: DISCONTINUED | OUTPATIENT
Start: 2022-10-24 | End: 2022-10-24 | Stop reason: HOSPADM

## 2022-10-24 RX ORDER — EPHEDRINE SULFATE 50 MG/ML
INJECTION, SOLUTION INTRAVENOUS
Status: DISCONTINUED | OUTPATIENT
Start: 2022-10-24 | End: 2022-10-24

## 2022-10-24 RX ORDER — HYDROCODONE BITARTRATE AND ACETAMINOPHEN 5; 325 MG/1; MG/1
1 TABLET ORAL EVERY 6 HOURS PRN
Qty: 12 TABLET | Refills: 0 | Status: SHIPPED | OUTPATIENT
Start: 2022-10-24 | End: 2023-03-02

## 2022-10-24 RX ORDER — MIDAZOLAM HYDROCHLORIDE 5 MG/ML
INJECTION INTRAMUSCULAR; INTRAVENOUS
Status: DISCONTINUED | OUTPATIENT
Start: 2022-10-24 | End: 2022-10-24

## 2022-10-24 RX ADMIN — LIDOCAINE HYDROCHLORIDE 100 MG: 20 INJECTION, SOLUTION EPIDURAL; INFILTRATION; INTRACAUDAL; PERINEURAL at 11:10

## 2022-10-24 RX ADMIN — PROPOFOL 150 MG: 10 INJECTION, EMULSION INTRAVENOUS at 11:10

## 2022-10-24 RX ADMIN — EPHEDRINE SULFATE 15 MG: 50 INJECTION INTRAVENOUS at 12:10

## 2022-10-24 RX ADMIN — CEFAZOLIN 2 G: 1 INJECTION, POWDER, FOR SOLUTION INTRAMUSCULAR; INTRAVENOUS; PARENTERAL at 11:10

## 2022-10-24 RX ADMIN — ONDANSETRON 8 MG: 2 INJECTION INTRAMUSCULAR; INTRAVENOUS at 11:10

## 2022-10-24 RX ADMIN — MIDAZOLAM HYDROCHLORIDE 2 MG: 5 INJECTION, SOLUTION INTRAMUSCULAR; INTRAVENOUS at 11:10

## 2022-10-24 RX ADMIN — SODIUM CHLORIDE, POTASSIUM CHLORIDE, SODIUM LACTATE AND CALCIUM CHLORIDE: 600; 310; 30; 20 INJECTION, SOLUTION INTRAVENOUS at 11:10

## 2022-10-24 RX ADMIN — FENTANYL CITRATE 100 MCG: 50 INJECTION INTRAMUSCULAR; INTRAVENOUS at 11:10

## 2022-10-24 NOTE — BRIEF OP NOTE
[facility] - Orthopedic Periop Services  Brief Operative Note           NAME: Praful Esposito III  MRN: [id number]  SURGERY DATE: 10/24/2022       PREOPERATIVE DIAGNOSIS: rightHand pain, right [M79.641]    POSTOPERATIVE DIAGNOSIS: rightPost-Op Diagnosis Codes:     * Hand pain, right [M79.641]    PROCEDURE: right carpal tunnel release     SURGEON: Dr. Pickard     ASSISTANT: none    ANESTHESIA: Choice    ESTIMATED BLOOD LOSS: 5c    COMPLICATIONS:  none    Specimens:   Specimen (24h ago, onward)      None              Discharge Note    OUTCOME: Patient tolerated treatment/procedure well without complication and is now ready for discharge.    DISPOSITION: Home or Self Care    FINAL DIAGNOSIS:  Carpal tunnel syndrome of right wrist    FOLLOWUP: In clinic    DISCHARGE INSTRUCTIONS:    Discharge Procedure Orders   Diet general     Call MD for:  temperature >100.4     Call MD for:  redness, tenderness, or signs of infection (pain, swelling, redness, odor or green/yellow discharge around incision site)     Leave dressing on - Keep it clean, dry, and intact until clinic visit         Jose Pickard III

## 2022-10-24 NOTE — TRANSFER OF CARE
"Anesthesia Transfer of Care Note    Patient: Praful Esposito III    Procedure(s) Performed: Procedure(s) (LRB):  RELEASE, CARPAL TUNNEL (Right)    Patient location: PACU    Anesthesia Type: general    Transport from OR: Transported from OR on 100% O2 by closed face mask with adequate spontaneous ventilation    Post pain: adequate analgesia    Post assessment: no apparent anesthetic complications    Post vital signs: stable    Level of consciousness: responds to stimulation    Nausea/Vomiting: no nausea/vomiting    Complications: none    Transfer of care protocol was followed      Last vitals:   Visit Vitals  BP (!) 87/49 (BP Location: Right arm, Patient Position: Lying)   Pulse 72   Temp 37 °C (98.6 °F) (Oral)   Resp 18   Ht 5' 9" (1.753 m)   Wt 82.1 kg (181 lb)   SpO2 97%   BMI 26.73 kg/m²     "

## 2022-10-24 NOTE — ANESTHESIA POSTPROCEDURE EVALUATION
Anesthesia Post Evaluation    Patient: Praful Esposito III    Procedure(s) Performed: Procedure(s) (LRB):  RELEASE, CARPAL TUNNEL (Right)    Final Anesthesia Type: general      Patient location during evaluation: PACU  Patient participation: Yes- Able to Participate  Level of consciousness: awake and alert  Post-procedure vital signs: reviewed and stable  Pain management: adequate  Airway patency: patent  DINO mitigation strategies: Multimodal analgesia  PONV status at discharge: No PONV  Anesthetic complications: no      Cardiovascular status: blood pressure returned to baseline  Respiratory status: unassisted  Hydration status: euvolemic  Follow-up not needed.          Vitals Value Taken Time   /68 10/24/22 1304   Temp 37 °C (98.6 °F) 10/24/22 1203   Pulse 72 10/24/22 1304   Resp 16 10/24/22 1304   SpO2 97 % 10/24/22 1304         Event Time   Out of Recovery 12:33:00         Pain/Gabriel Score: Gabriel Score: 10 (10/24/2022  1:05 PM)

## 2022-10-24 NOTE — OR NURSING
1201 Rec'd pt to PACU asleep with oral airway in place. Hypotension noted, IV fluids increased. All other VSS. No signs of distress noted. Right hand dressing C/D/I, cap refill less than 3 seconds, radial pulse 2+. Will continue to monitor.     1218 Oral airway removed. Respirations even and unlabored. Reoriented to surroundings. Denies pain/needs at this time. BP stabilized; see flowsheet for details.    1235 Pt to ASC 22 awake and alert with no distress noted, respirations even and unlabored. Spouse at bedside. Bedside report given to ABHILASH Mcneal RN. Right hand dressing C/D/I. Able to wiggle right fingers on command, cap refill less than 3 seconds, radial pulse 2+. Denies pain/needs. /71, P 71, R 16, O2 97% room air.

## 2022-10-24 NOTE — ANESTHESIA PREPROCEDURE EVALUATION
10/24/2022  Praful Esposito III is a 72 y.o., male.      Pre-op Assessment    I have reviewed the Patient Summary Reports.    I have reviewed the NPO Status.   I have reviewed the Medications.     Review of Systems  Social:  Non-Smoker, No Alcohol Use    Hematology/Oncology:  Hematology Normal   Oncology Normal     EENT/Dental:EENT/Dental Normal   Cardiovascular:   Hypertension    Pulmonary:  Pulmonary Normal    Renal/:  Renal/ Normal     Hepatic/GI:   GERD    Musculoskeletal:  Musculoskeletal Normal    Neurological:  Neurology Normal    Endocrine:  Endocrine Normal  Morbid Obesity / BMI > 40  Dermatological:  Skin Normal    Psych:  Psychiatric Normal           Physical Exam  General: Well nourished, Cooperative, Alert and Oriented    Airway:  Mallampati: II / II  Mouth Opening: Normal  TM Distance: Normal  Neck ROM: Normal ROM    Dental:  Intact    Chest/Lungs:  Clear to auscultation    Heart:  Rate: Normal  Rhythm: Regular Rhythm  Sounds: Normal        Anesthesia Plan  Type of Anesthesia, risks & benefits discussed:    Anesthesia Type: Gen Supraglottic Airway  Intra-op Monitoring Plan: Standard ASA Monitors  Post Op Pain Control Plan: multimodal analgesia  Induction:  IV  Airway Plan: Direct  Informed Consent: Informed consent signed with the Patient and all parties understand the risks and agree with anesthesia plan.  All questions answered.   ASA Score: 3  Day of Surgery Review of History & Physical: H&P Update referred to the surgeon/provider.    Ready For Surgery From Anesthesia Perspective.     .

## 2022-10-24 NOTE — OP NOTE
Department of Orthopedic Surgery    Operative Note      NAME: Praful Esposito III  MRN: [id number]  SURGERY DATE: 10/24/2022       PREOPERATIVE DIAGNOSIS: rightHand pain, right [M79.641]    POSTOPERATIVE DIAGNOSIS: rightPost-Op Diagnosis Codes:     * Hand pain, right [M79.641]    PROCEDURE: right carpal tunnel release     SURGEON: Dr. Pickard     ASSISTANT: none    ANESTHESIA: Choice    ESTIMATED BLOOD LOSS: 5c    COMPLICATIONS:  none    INDICATION: Praful Esposito III is a 72 y.o. year old  who has failed conservative treatment.  EMG's were positive for carpal tunnel syndrome.  Risk and benefits were discussed at length.        PROCEDURE: The patient was brought to the operating room and anesthesia was administered per anesthesia.  The right arm was prepped and draped in normal sterile fashion.  A volar approach with right angle was made at the level of the wrist.  Sharp dissection was carried down to the level of the transverse carpal ligament.  A [place] was passed underneath the transverse carpal ligament and brought superficially and ulnarly.  Sharp dissection was carried down.  The most proximal and distal aspects were released under direct visualization using [place] being sure to protect the nerve.  Little finger could be passed proximally and distally without any difficulty.  The wound was irrigated out copiously and injected with plain Marcaine.  The tourniquet was released.  Hemostasis was obtained.  The wound was closed with 4-0 Vicryl interrupted and 4-0 Nylon corner stitch and running suture.  A sterile bulky dressing was applied, leaving the fingers free for range of motion.   The patient tolerated the procedure well.      Jose Pickard III

## 2022-10-24 NOTE — INTERVAL H&P NOTE
The patient has been examined and the H&P has been reviewed:    Patient had EMG is performed at [facility] in [facility] on 10/13/2022 that showed severe right carpal tunnel moderate left carpal tunnel mild right ulnar nerve neuropathy at the elbow mild cervical radiculopathy C7 and C8.    Assessment bilateral carpal tunnel worse on the right    Plan outpatient right carpal tunnel release risks benefits discussed at length with the patient  The patient may have a double crush component here and also severe disease being some permanent nerve injury on the right      I had a long discussion with the patient about treatment options, including operative and nonoperative treatments. We discussed pros and cons of each including risks pertinent to surgery including pain, infection, bleeding, damage to adjacent structures like nerves and blood vessels, failure to heal, need for future surgeries, stiffness, instability, loss of limb, anesthesia risks like stroke, blood clot, loss of life. We discussed the possibility of need for later hardware removal in the case that hardware was used. We discussed common and uncommon risks, and discussed patient specific factors that may increase the risks present with surgery. All questions were answered. The patient expressed understanding of the pros and cons of surgery and wanted to proceed with surgical treatment.     Surgery risks, benefits and alternative options discussed and understood by patient/family.          There are no hospital problems to display for this patient.

## 2022-11-08 NOTE — H&P (VIEW-ONLY)
CC:   Chief Complaint   Patient presents with    Follow-up     RT CTR 10/24 (15 DAYS)        PREVIOUS INFO:  EMG is [facility] dated 10/13/2022  Severe right carpal tunnel  Moderate left carpal tunnel  Mild right ulnar nerve neuropathy  Mild chronic bilateral C7, see 8/T1 radiculopathy without ongoing denervation        HISTORY:   11/8/2022    Praful Esposito III  is a 72 y.o. status post right carpal tunnel release 10/24/2022 he is pleased still numbness on the thumb but does desire proceed with her left carpal tunnel release is pleased with the right      PAST MEDICAL HISTORY:   Past Medical History:   Diagnosis Date    Acute bronchitis     Benign prostatic hyperplasia with lower urinary tract symptoms     Bladder neck obstruction     Chronic prostatitis     Dysplasia of prostate     Elevated PSA     Fatigue     Urinary tract infection           PAST SURGICAL HISTORY:   Past Surgical History:   Procedure Laterality Date    BACK SURGERY      CARPAL TUNNEL RELEASE Right 10/24/2022    Procedure: RELEASE, CARPAL TUNNEL;  Surgeon: Jose Pickard III, MD;  Location: [facility];  Service: Orthopedics;  Laterality: Right;    TONSILLECTOMY      TRUS Biopsy of prostate            ALLERGIES: Review of patient's allergies indicates:  No Known Allergies     MEDICATIONS :    Current Outpatient Medications:     diclofenac sodium (SOLARAZE) 3 % gel, Apply topically 2 (two) times daily., Disp: , Rfl:     diphenhydrAMINE (SOMINEX) 25 mg tablet, Take 25 mg by mouth nightly as needed for Insomnia., Disp: , Rfl:     esomeprazole (NEXIUM) 40 MG capsule, Take 1 capsule (40 mg total) by mouth before breakfast., Disp: 90 capsule, Rfl: 3    HYDROcodone-acetaminophen (NORCO) 5-325 mg per tablet, Take 1 tablet by mouth every 6 (six) hours as needed for Pain., Disp: 12 tablet, Rfl: 0    ibuprofen (ADVIL,MOTRIN) 400 MG tablet, Take 400 mg by mouth nightly as needed for Other., Disp: , Rfl:     methocarbamol  (ROBAXIN-750 ORAL), Take 2 tablets by mouth as needed., Disp: , Rfl:      SOCIAL HISTORY:   Social History     Socioeconomic History    Marital status:    Tobacco Use    Smoking status: Former     Packs/day: 0.50     Years: 3.00     Pack years: 1.50     Types: Cigarettes    Smokeless tobacco: Former     Types: Chew   Substance and Sexual Activity    Alcohol use: Not Currently     Comment: occassionaly    Drug use: Never    Sexual activity: Yes     Partners: Female     Birth control/protection: None        ROS    FAMILY HISTORY:   Family History   Problem Relation Age of Onset    Clotting disorder Other     Cancer Other     Dementia Other     Heart disease Other     Cancer Father           PHYSICAL EXAM: There were no vitals filed for this visit.            There is no height or weight on file to calculate BMI.     In general, this is a well-developed, well-nourished male . The patient is alert, oriented and cooperative.      HEENT:  Normocephalic, atraumatic.  Extraocular movements are intact bilaterally.  The oropharynx is benign.       NECK:  Nontender with good range of motion.      PULMONARY: Respirations are even and non-labored.       CARDIOVASCULAR: Pulses regular by peripheral palpation.       ABDOMEN:  Soft, non-tender, non-distended.        EXTREMITIES:  Incision looks good of moving his fingers well on the right has a positive Tinel's on the left    Ortho Exam      RADIOGRAPHIC FINDINGS:       .      IMPRESSION:  Status post right carpal tunnel release with left carpal tunnel syndrome     PLAN:  Stitches taken out the right massage desensitization on the right     plan to proceed with left carpal tunnel release      I had a long discussion with the patient about treatment options, including operative and nonoperative treatments. We discussed pros and cons of each including risks pertinent to surgery including pain, infection, bleeding, damage to adjacent structures like nerves and blood vessels,  failure to heal, need for future surgeries, stiffness, instability, loss of limb, anesthesia risks like stroke, blood clot, loss of life. We discussed the possibility of need for later hardware removal in the case that hardware was used. We discussed common and uncommon risks, and discussed patient specific factors that may increase the risks present with surgery. All questions were answered. The patient expressed understanding of the pros and cons of surgery and wanted to proceed with surgical treatment.       No follow-ups on file.         Jose Pickard III      (Subject to voice recognition error, transcription service not allowed)

## 2022-12-05 ENCOUNTER — ANESTHESIA (OUTPATIENT)
Dept: SURGERY | Facility: HOSPITAL | Age: 72
End: 2022-12-05
Payer: MEDICARE

## 2022-12-05 ENCOUNTER — ANESTHESIA EVENT (OUTPATIENT)
Dept: SURGERY | Facility: HOSPITAL | Age: 72
End: 2022-12-05
Payer: MEDICARE

## 2022-12-05 ENCOUNTER — HOSPITAL ENCOUNTER (OUTPATIENT)
Facility: HOSPITAL | Age: 72
Discharge: HOME | End: 2022-12-05
Attending: ORTHOPAEDIC SURGERY | Admitting: ORTHOPAEDIC SURGERY
Payer: MEDICARE

## 2022-12-05 VITALS
HEIGHT: 69 IN | DIASTOLIC BLOOD PRESSURE: 82 MMHG | OXYGEN SATURATION: 98 % | RESPIRATION RATE: 16 BRPM | WEIGHT: 180 LBS | BODY MASS INDEX: 26.66 KG/M2 | SYSTOLIC BLOOD PRESSURE: 139 MMHG | HEART RATE: 55 BPM | TEMPERATURE: 97 F

## 2022-12-05 DIAGNOSIS — G56.00 CTS (CARPAL TUNNEL SYNDROME): Primary | ICD-10-CM

## 2022-12-05 PROCEDURE — 25000003 PHARM REV CODE 250: Performed by: NURSE ANESTHETIST, CERTIFIED REGISTERED

## 2022-12-05 PROCEDURE — 25000003 PHARM REV CODE 250: Performed by: ORTHOPAEDIC SURGERY

## 2022-12-05 PROCEDURE — 71000015 HC POSTOP RECOV 1ST HR: Performed by: ORTHOPAEDIC SURGERY

## 2022-12-05 PROCEDURE — 71000033 HC RECOVERY, INTIAL HOUR: Performed by: ORTHOPAEDIC SURGERY

## 2022-12-05 PROCEDURE — 63600175 PHARM REV CODE 636 W HCPCS: Performed by: NURSE ANESTHETIST, CERTIFIED REGISTERED

## 2022-12-05 PROCEDURE — 37000009 HC ANESTHESIA EA ADD 15 MINS: Performed by: ORTHOPAEDIC SURGERY

## 2022-12-05 PROCEDURE — D9220A PRA ANESTHESIA: ICD-10-PCS | Mod: CRNA,,, | Performed by: NURSE ANESTHETIST, CERTIFIED REGISTERED

## 2022-12-05 PROCEDURE — D9220A PRA ANESTHESIA: Mod: ANES,,, | Performed by: ANESTHESIOLOGY

## 2022-12-05 PROCEDURE — 37000008 HC ANESTHESIA 1ST 15 MINUTES: Performed by: ORTHOPAEDIC SURGERY

## 2022-12-05 PROCEDURE — D9220A PRA ANESTHESIA: ICD-10-PCS | Mod: ANES,,, | Performed by: ANESTHESIOLOGY

## 2022-12-05 PROCEDURE — 27000716 HC OXISENSOR PROBE, ANY SIZE: Performed by: ANESTHESIOLOGY

## 2022-12-05 PROCEDURE — D9220A PRA ANESTHESIA: Mod: CRNA,,, | Performed by: NURSE ANESTHETIST, CERTIFIED REGISTERED

## 2022-12-05 PROCEDURE — 36000707: Performed by: ORTHOPAEDIC SURGERY

## 2022-12-05 PROCEDURE — 36000706: Performed by: ORTHOPAEDIC SURGERY

## 2022-12-05 RX ORDER — LIDOCAINE HYDROCHLORIDE 5 MG/ML
INJECTION, SOLUTION INFILTRATION; INTRAVENOUS
Status: DISCONTINUED | OUTPATIENT
Start: 2022-12-05 | End: 2022-12-05

## 2022-12-05 RX ORDER — MIDAZOLAM HYDROCHLORIDE 1 MG/ML
INJECTION INTRAMUSCULAR; INTRAVENOUS
Status: DISCONTINUED | OUTPATIENT
Start: 2022-12-05 | End: 2022-12-05

## 2022-12-05 RX ORDER — HYDROMORPHONE HYDROCHLORIDE 2 MG/ML
0.5 INJECTION, SOLUTION INTRAMUSCULAR; INTRAVENOUS; SUBCUTANEOUS EVERY 5 MIN PRN
Status: DISCONTINUED | OUTPATIENT
Start: 2022-12-05 | End: 2022-12-05 | Stop reason: HOSPADM

## 2022-12-05 RX ORDER — DIPHENHYDRAMINE HYDROCHLORIDE 50 MG/ML
INJECTION INTRAMUSCULAR; INTRAVENOUS
Status: DISCONTINUED | OUTPATIENT
Start: 2022-12-05 | End: 2022-12-05

## 2022-12-05 RX ORDER — PROPOFOL 10 MG/ML
VIAL (ML) INTRAVENOUS
Status: DISCONTINUED | OUTPATIENT
Start: 2022-12-05 | End: 2022-12-05

## 2022-12-05 RX ORDER — LIDOCAINE HYDROCHLORIDE 20 MG/ML
INJECTION, SOLUTION EPIDURAL; INFILTRATION; INTRACAUDAL; PERINEURAL
Status: DISCONTINUED | OUTPATIENT
Start: 2022-12-05 | End: 2022-12-05

## 2022-12-05 RX ORDER — HYDROCODONE BITARTRATE AND ACETAMINOPHEN 5; 325 MG/1; MG/1
1 TABLET ORAL EVERY 4 HOURS PRN
Status: DISCONTINUED | OUTPATIENT
Start: 2022-12-05 | End: 2022-12-05 | Stop reason: HOSPADM

## 2022-12-05 RX ORDER — DIPHENHYDRAMINE HYDROCHLORIDE 50 MG/ML
25 INJECTION INTRAMUSCULAR; INTRAVENOUS EVERY 6 HOURS PRN
Status: DISCONTINUED | OUTPATIENT
Start: 2022-12-05 | End: 2022-12-05 | Stop reason: HOSPADM

## 2022-12-05 RX ORDER — ONDANSETRON 2 MG/ML
4 INJECTION INTRAMUSCULAR; INTRAVENOUS DAILY PRN
Status: DISCONTINUED | OUTPATIENT
Start: 2022-12-05 | End: 2022-12-05 | Stop reason: HOSPADM

## 2022-12-05 RX ORDER — SODIUM CHLORIDE, SODIUM LACTATE, POTASSIUM CHLORIDE, CALCIUM CHLORIDE 600; 310; 30; 20 MG/100ML; MG/100ML; MG/100ML; MG/100ML
INJECTION, SOLUTION INTRAVENOUS CONTINUOUS PRN
Status: DISCONTINUED | OUTPATIENT
Start: 2022-12-05 | End: 2022-12-05

## 2022-12-05 RX ORDER — ONDANSETRON 4 MG/1
8 TABLET, ORALLY DISINTEGRATING ORAL EVERY 8 HOURS PRN
Status: DISCONTINUED | OUTPATIENT
Start: 2022-12-05 | End: 2022-12-05 | Stop reason: HOSPADM

## 2022-12-05 RX ORDER — FENTANYL CITRATE 50 UG/ML
INJECTION, SOLUTION INTRAMUSCULAR; INTRAVENOUS
Status: DISCONTINUED | OUTPATIENT
Start: 2022-12-05 | End: 2022-12-05

## 2022-12-05 RX ORDER — MORPHINE SULFATE 10 MG/ML
4 INJECTION INTRAMUSCULAR; INTRAVENOUS; SUBCUTANEOUS EVERY 5 MIN PRN
Status: DISCONTINUED | OUTPATIENT
Start: 2022-12-05 | End: 2022-12-05 | Stop reason: HOSPADM

## 2022-12-05 RX ORDER — BUPIVACAINE HYDROCHLORIDE 5 MG/ML
INJECTION, SOLUTION EPIDURAL; INTRACAUDAL
Status: DISCONTINUED | OUTPATIENT
Start: 2022-12-05 | End: 2022-12-05 | Stop reason: HOSPADM

## 2022-12-05 RX ORDER — HYDROCODONE BITARTRATE AND ACETAMINOPHEN 5; 325 MG/1; MG/1
1 TABLET ORAL EVERY 4 HOURS PRN
Qty: 20 TABLET | Refills: 0 | Status: SHIPPED | OUTPATIENT
Start: 2022-12-05 | End: 2023-03-02

## 2022-12-05 RX ORDER — ONDANSETRON 2 MG/ML
INJECTION INTRAMUSCULAR; INTRAVENOUS
Status: DISCONTINUED | OUTPATIENT
Start: 2022-12-05 | End: 2022-12-05

## 2022-12-05 RX ORDER — MEPERIDINE HYDROCHLORIDE 25 MG/ML
25 INJECTION INTRAMUSCULAR; INTRAVENOUS; SUBCUTANEOUS EVERY 10 MIN PRN
Status: DISCONTINUED | OUTPATIENT
Start: 2022-12-05 | End: 2022-12-05 | Stop reason: HOSPADM

## 2022-12-05 RX ORDER — CEFAZOLIN SODIUM 1 G/3ML
INJECTION, POWDER, FOR SOLUTION INTRAMUSCULAR; INTRAVENOUS
Status: DISCONTINUED | OUTPATIENT
Start: 2022-12-05 | End: 2022-12-05

## 2022-12-05 RX ADMIN — PROPOFOL 30 MG: 10 INJECTION, EMULSION INTRAVENOUS at 10:12

## 2022-12-05 RX ADMIN — PROPOFOL 20 MG: 10 INJECTION, EMULSION INTRAVENOUS at 10:12

## 2022-12-05 RX ADMIN — LIDOCAINE HYDROCHLORIDE 50 ML: 5 INJECTION, SOLUTION INFILTRATION; INTRAVENOUS at 10:12

## 2022-12-05 RX ADMIN — SODIUM CHLORIDE, POTASSIUM CHLORIDE, SODIUM LACTATE AND CALCIUM CHLORIDE: 600; 310; 30; 20 INJECTION, SOLUTION INTRAVENOUS at 10:12

## 2022-12-05 RX ADMIN — DIPHENHYDRAMINE HYDROCHLORIDE 12.5 MG: 50 INJECTION, SOLUTION INTRAMUSCULAR; INTRAVENOUS at 10:12

## 2022-12-05 RX ADMIN — CEFAZOLIN 2 G: 1 INJECTION, POWDER, FOR SOLUTION INTRAMUSCULAR; INTRAVENOUS; PARENTERAL at 10:12

## 2022-12-05 RX ADMIN — FENTANYL CITRATE 50 MCG: 50 INJECTION INTRAMUSCULAR; INTRAVENOUS at 10:12

## 2022-12-05 RX ADMIN — MIDAZOLAM 2 MG: 1 INJECTION INTRAMUSCULAR; INTRAVENOUS at 10:12

## 2022-12-05 RX ADMIN — FENTANYL CITRATE 25 MCG: 50 INJECTION INTRAMUSCULAR; INTRAVENOUS at 10:12

## 2022-12-05 RX ADMIN — LIDOCAINE HYDROCHLORIDE 20 MG: 20 INJECTION, SOLUTION INTRAVENOUS at 10:12

## 2022-12-05 RX ADMIN — ONDANSETRON 4 MG: 2 INJECTION INTRAMUSCULAR; INTRAVENOUS at 10:12

## 2022-12-05 NOTE — BRIEF OP NOTE
[facility] - Orthopedic Periop Services  Brief Operative Note       SURGERY DATE: 12/5/2022         PREOPERATIVE DIAGNOSIS: leftBilateral carpal tunnel syndrome [G56.03]     POSTOPERATIVE DIAGNOSIS: leftPost-Op Diagnosis Codes:     * Bilateral carpal tunnel syndrome [G56.03]     PROCEDURE: left carpal tunnel release      SURGEON: Dr. Pickard      ASSISTANT: none     ANESTHESIA:  Beer block     ESTIMATED BLOOD LOSS: 5cc     COMPLICATIONS:  none  Specimens:   Specimen (24h ago, onward)      None              Discharge Note    OUTCOME: Patient tolerated treatment/procedure well without complication and is now ready for discharge.    DISPOSITION: Home or Self Care    FINAL DIAGNOSIS:  CTS (carpal tunnel syndrome)    FOLLOWUP: In clinic    DISCHARGE INSTRUCTIONS:    Discharge Procedure Orders   Diet general     Call MD for:  temperature >100.4     Call MD for:  redness, tenderness, or signs of infection (pain, swelling, redness, odor or green/yellow discharge around incision site)     Leave dressing on - Keep it clean, dry, and intact until clinic visit         Jose Pickard III

## 2022-12-05 NOTE — TRANSFER OF CARE
"Anesthesia Transfer of Care Note    Patient: Praful Esposito III    Procedure(s) Performed: Procedure(s) (LRB):  RELEASE, CARPAL TUNNEL (Left)    Patient location: PACU    Anesthesia Type: general and regional    Transport from OR: Transported from OR on room air with adequate spontaneous ventilation    Post pain: adequate analgesia    Post assessment: no apparent anesthetic complications    Post vital signs: stable    Level of consciousness: awake and alert    Nausea/Vomiting: no nausea/vomiting    Complications: none    Transfer of care protocol was followed      Last vitals:   Visit Vitals  BP (!) 149/74   Pulse 67   Temp 36.7 °C (98.1 °F) (Oral)   Resp 16   Ht 5' 9" (1.753 m)   Wt 81.6 kg (180 lb)   SpO2 96%   BMI 26.58 kg/m²     "

## 2022-12-05 NOTE — OP NOTE
Department of Orthopedic Surgery    Operative Note      NAME: Praful Esposito III  MRN: [id number]  SURGERY DATE: 12/5/2022       PREOPERATIVE DIAGNOSIS: leftBilateral carpal tunnel syndrome [G56.03]    POSTOPERATIVE DIAGNOSIS: leftPost-Op Diagnosis Codes:     * Bilateral carpal tunnel syndrome [G56.03]    PROCEDURE: left carpal tunnel release     SURGEON: Dr. Pickard     ASSISTANT: none    ANESTHESIA: beer block    ESTIMATED BLOOD LOSS: 5cc    COMPLICATIONS:  none    INDICATION: Praful Esposito III is a 72 y.o. year old  who has failed conservative treatment.  EMG's were positive for carpal tunnel syndrome.  Risk and benefits were discussed at length.        PROCEDURE: The patient was brought to the operating room and anesthesia was administered per anesthesia.  The left arm was prepped and draped in normal sterile fashion.  A volar approach with right angle was made at the level of the wrist.  Sharp dissection was carried down to the level of the transverse carpal ligament.  A [place] was passed underneath the transverse carpal ligament and brought superficially and ulnarly.  Sharp dissection was carried down.  The most proximal and distal aspects were released under direct visualization using [place] being sure to protect the nerve.  Little finger could be passed proximally and distally without any difficulty.  The wound was irrigated out copiously and injected with plain Marcaine.  The tourniquet was released.  Hemostasis was obtained.  The wound was closed with 4-0 Vicryl interrupted and 4-0 Nylon corner stitch and running suture.  A sterile bulky dressing was applied, leaving the fingers free for range of motion.   The patient tolerated the procedure well.      Jose Pickard III

## 2022-12-05 NOTE — ANESTHESIA PREPROCEDURE EVALUATION
12/05/2022  Praful Esposito III is a 72 y.o., male.      Pre-op Assessment    I have reviewed the Patient Summary Reports.    I have reviewed the NPO Status.   I have reviewed the Medications.     Review of Systems         Anesthesia Plan  Type of Anesthesia, risks & benefits discussed:    Anesthesia Type: Regional  Intra-op Monitoring Plan: Standard ASA Monitors  Post Op Pain Control Plan: multimodal analgesia  Informed Consent: Informed consent signed with the Patient and all parties understand the risks and agree with anesthesia plan.  All questions answered.   ASA Score: 2    Ready For Surgery From Anesthesia Perspective.     .  NPO greater than 8 hours  NAC  NKDA    Medical History   Acute bronchitis Elevated PSA   Benign prostatic hyperplasia with lower urinary tract symptoms Chronic prostatitis   Dysplasia of prostate Bladder neck obstruction   Fatigue Urinary tract infection     Airway exam deferred (COVID precautions); adequate ROM at neck.    Plan is [place] block

## 2022-12-05 NOTE — ANESTHESIA POSTPROCEDURE EVALUATION
Anesthesia Post Evaluation    Patient: Praful Esposito III    Procedure(s) Performed: Procedure(s) (LRB):  RELEASE, CARPAL TUNNEL (Left)    Final Anesthesia Type: regional      Patient location during evaluation: PACU  Post-procedure vital signs: reviewed and stable  Pain management: adequate  Airway patency: patent  DINO mitigation strategies: Use of major conduction anesthesia (spinal/epidural) or peripheral nerve block  PONV status at discharge: No PONV  Anesthetic complications: no      Cardiovascular status: hemodynamically stable  Respiratory status: unassisted  Hydration status: euvolemic  Follow-up not needed.          Vitals Value Taken Time   /67 12/05/22 1114   Temp  12/05/22 1118   Pulse 54 12/05/22 1118   Resp 12 12/05/22 1053   SpO2 96 % 12/05/22 1118   Vitals shown include unvalidated device data.      No case tracking events are documented in the log.      Pain/Gabriel Score: Gabriel Score: 8 (12/5/2022 10:49 AM)

## 2022-12-13 ENCOUNTER — OFFICE VISIT (OUTPATIENT)
Dept: UROLOGY | Facility: CLINIC | Age: 72
End: 2022-12-13
Payer: MEDICARE

## 2022-12-13 VITALS
DIASTOLIC BLOOD PRESSURE: 90 MMHG | WEIGHT: 188 LBS | HEART RATE: 62 BPM | BODY MASS INDEX: 27.85 KG/M2 | SYSTOLIC BLOOD PRESSURE: 157 MMHG | HEIGHT: 69 IN

## 2022-12-13 DIAGNOSIS — N13.8 BENIGN PROSTATIC HYPERPLASIA WITH URINARY OBSTRUCTION: ICD-10-CM

## 2022-12-13 DIAGNOSIS — R53.83 FATIGUE, UNSPECIFIED TYPE: ICD-10-CM

## 2022-12-13 DIAGNOSIS — N40.1 BENIGN PROSTATIC HYPERPLASIA WITH URINARY OBSTRUCTION: ICD-10-CM

## 2022-12-13 DIAGNOSIS — R97.20 ELEVATED PSA: Primary | ICD-10-CM

## 2022-12-13 DIAGNOSIS — E29.1 TESTICULAR HYPOFUNCTION: ICD-10-CM

## 2022-12-13 DIAGNOSIS — N41.1 CHRONIC PROSTATITIS: ICD-10-CM

## 2022-12-13 PROCEDURE — 99213 OFFICE O/P EST LOW 20 MIN: CPT | Mod: S$PBB,,, | Performed by: UROLOGY

## 2022-12-13 PROCEDURE — 99213 PR OFFICE/OUTPT VISIT, EST, LEVL III, 20-29 MIN: ICD-10-PCS | Mod: S$PBB,,, | Performed by: UROLOGY

## 2022-12-13 PROCEDURE — 99214 OFFICE O/P EST MOD 30 MIN: CPT | Mod: PBBFAC | Performed by: UROLOGY

## 2022-12-13 NOTE — PROGRESS NOTES
Subjective:       Patient ID: Praful Esposito III is a 72 y.o. male.    Chief Complaint: Follow-up (6 month OV, PSA, Testosterone Estradol)       History of Present Illness  Mr. Esposito is now 64 years old. He is seen for the first time as a clinic patient in nearly 5 years. He has been seen at yearly screens and has had PSAs done virtually every year. He has a relatively high normal PSA. He has had some increase in lower tract obstructive symptoms. He does not void as well compared to what he used to. He has not on any regular medications and does not feel he needs anything yet to help with the urine flow. No new health issues that he is aware of but he has not had any blood work in several years and desires general check up also. Does not have primary physician currently but has used Dr. Gonzalez in the past.  (April 1, 2015)     Mr. Esposito's PSA has continued to go up. It was 4.71 at recent prostate screening in September. We had recommended that he have prostate biopsies and he comes in today to have these done. Clinically unchanged. He does have mild-to-moderate lower tract obstructive symptoms.  (November 19 2015)     Mr. Esposito is in for follow-up of PSA elevation. 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 Prostate size was 50 mL. He has not been having a good stream and has trouble emptying and has slowing of his stream. He desires no medication to help with that at this time. He has had a good 6 months overall. (July 26, 2016)     Mr. Esposito is in for his 6 month follow-up for PSA elevation. Biopsies slightly over a year ago did not show any suggestion of cancer but did have high-grade PIN and continued follow-up indicated. He is doing okay clinically with no worsening bladder outlet obstructive symptoms. PSA is down slightly to 4.460.  (January 31, 2017)     Mr. Esposito is in for 6 month follow-up of chronic PSA elevation. Clinically doing okay with no  worsening bladder outlet obstructive symptoms or other difficulty. (August 1, 2017)     Mr. Esposito had his MRI of the prostate done on September 12, 2017. The size of the gland was 61 mL. The patient had  no suspicious lesions identified and no biopsy was felt to be indicated. Returned today for follow-up PSA and recheck. He's had no significant increase in voiding symptoms with relatively mild bladder outlet obstructive problems. No new health issues. Recently retired from [employer]. (February 6, 2018)     Mr. Esposito is in for his 6 month follow-up for PSA elevation. Clinically doing okay without any worsening bladder outlet obstructive symptoms. Typically nocturia x1. He takes no medications. Overall satisfactory 6 months. (August 7, 2018)     Mr. Esposito is in for first time in a year. He missed his last appointment because he was getting over back surgery that he had in December. He had that done in [facility]. He is doing fairly well with voiding with some slight hesitancy and usually nocturia x1. He does not want any pharmacologic help with the way he is voiding. (October 31, 2019)     Mr. Esposito is in for first visit in a year. He has had chronic PSA elevation for several years. He's had previous biopsies and had previous MRI of the prostate done at [place]. Clinically doing okay with nocturia one to 2 times nightly. He's had no new health problems this year feels he is stable clinically. (November 9, 2020)     PSA was 7.510 on 12/14/2020  PSA was 7.810 on November 9, 2020  PSA was 5.740 on October 31, 2019   PSA was 6.280 on  August 7, 2018  PSA was 5.050 on February 6, 2018  PSA was 6.650 on August 1, 2017  PSA was 4.460 on 1/31/2017  PSA was 4.970 on July 26, 2016  PSA was 4.71 on September 22, 2015  PSA was 3.630 on April 1, 2015, Past PSA Results   PSA was 2.89 on September 13, 2011  2.03 on September 22, 2009  2.54 on September 23, 2008  1.88 on September 18, 2007  2.59 on September 19, 2006  1.92 on  September 20, 2005  1.3 on September 16, 2003  1.5 on September 27, 2000  -------------------------------------------------------------------------------------------------------------------------------------------------------------------------------------------------------------------  -The above notes are from the old electronic medical record--------------------------------------------------------------------     PSA was 9.130 on November 16, 2021  PSA was 8.640 on June 13, 2022  PSA was 8.630 on December 13, 2022     Mr. Esposito is in for 1st visit in a year.  He has chronic PSA elevation.  PSA pending while he was in the office.  He feels he is stable with no change with nocturia only 1 time nightly.  He has been followed for PSA elevation for over 10 years.  No worsening bladder outlet obstructive symptoms.  Typically nocturia only 1 time nightly like he has had for the last few years.  He had a MRI of prostate at [place] in September 2017. Had biopsies done in 2015. General health has been good and he has had no other health issues. (November 15, 2021)          Mr. Esposito  was in for his MRI of the prostate and follow-up of his PSA elevation that had actually gone to the highest level it has ever been on his PSA done last month.  No worsening bladder outlet obstructive symptoms. (December 9, 2021)     Mr. Esposito is in for his 6 month follow-up of chronic PSA elevation.  He feels he is stable with voiding with nocturia x1 on no medications.  MRI was a PI-RADS 2 with 57 mL prostate.  He does not feel he needs any help with voiding at present and has had a good 6 months.   PSA drawn and pending.  (June 13, 2022)    Mr. Esposito is in for his 6 month follow-up for PSA elevation.  He also wanted to get his testosterone and estradiol checked.  Energy level has not been as good as desirable.  He has had a lot of trouble with the shoulders recently and also had a carpal tunnel release recently.  Nocturia 2 times nightly  with no worsening bladder outlet obstructive symptoms.  2 lb weight loss from last visit.  He is having some major discomfort from his shoulders and may be forced to do something about that in the near future. [December 13, 2022]        Review of Systems      Objective:      Physical Exam  Constitutional:       Appearance: Normal appearance. He is normal weight.   Genitourinary:     Prostate: Normal.      Rectum: Normal.      Comments: Prostate 40 g smooth symmetrical firm and probably benign  Neurological:      Mental Status: He is alert.   Psychiatric:         Mood and Affect: Mood normal.         Behavior: Behavior normal.     Urinalysis revealed occasional epithelial cells with 0-2 pus cells.  The dipstick had 1+ leukocytes with pH of 6.0 and specific gravity 1.010  Assessment:       Problem List Items Addressed This Visit          Renal/    Benign prostatic hyperplasia     Other Visit Diagnoses       Elevated PSA    -  Primary    Relevant Orders    PSA, Total (Diagnostic)    Fatigue, unspecified type        Testicular hypofunction        Chronic prostatitis                Plan:         Patient desires no extra help with the way he is voiding currently.  PSA returned after patient left the office and is fairly stable at 8.630.  His testosterone is normal at 401 and his estradiol normal at 36.9.  Patient will be notified of results.  Six month appointment with PSA or sooner if needed.

## 2022-12-13 NOTE — PATIENT INSTRUCTIONS
Patient desires no extra help with the way he is voiding currently.  PSA returned after patient left the office and is fairly stable at 8.630.  His testosterone is normal at 401 and his estradiol normal at 36.9.  Patient will be notified of results.  Six month appointment with PSA or sooner if needed.

## 2022-12-20 ENCOUNTER — HOSPITAL ENCOUNTER (OUTPATIENT)
Dept: RADIOLOGY | Facility: HOSPITAL | Age: 72
Discharge: HOME | End: 2022-12-20
Attending: ORTHOPAEDIC SURGERY
Payer: MEDICARE

## 2022-12-20 DIAGNOSIS — M25.519 SHOULDER PAIN, UNSPECIFIED CHRONICITY, UNSPECIFIED LATERALITY: ICD-10-CM

## 2022-12-20 PROCEDURE — 73030 X-RAY EXAM OF SHOULDER: CPT | Mod: TC,50

## 2023-01-17 ENCOUNTER — OFFICE VISIT (OUTPATIENT)
Dept: ORTHOPEDICS | Facility: CLINIC | Age: 73
End: 2023-01-17
Payer: MEDICARE

## 2023-01-17 DIAGNOSIS — M25.519 SHOULDER PAIN, UNSPECIFIED CHRONICITY, UNSPECIFIED LATERALITY: Primary | ICD-10-CM

## 2023-01-17 PROCEDURE — 99213 PR OFFICE/OUTPT VISIT, EST, LEVL III, 20-29 MIN: ICD-10-PCS | Mod: S$PBB,,, | Performed by: ORTHOPAEDIC SURGERY

## 2023-01-17 PROCEDURE — 99213 OFFICE O/P EST LOW 20 MIN: CPT | Mod: S$PBB,,, | Performed by: ORTHOPAEDIC SURGERY

## 2023-01-17 PROCEDURE — 99212 OFFICE O/P EST SF 10 MIN: CPT | Mod: PBBFAC | Performed by: ORTHOPAEDIC SURGERY

## 2023-01-17 RX ORDER — MELOXICAM 7.5 MG/1
7.5 TABLET ORAL DAILY
Qty: 30 TABLET | Refills: 1 | Status: SHIPPED | OUTPATIENT
Start: 2023-01-17 | End: 2023-03-02

## 2023-01-17 NOTE — PROGRESS NOTES
CC:   Chief Complaint   Patient presents with    Follow-up     RECHECK BILATERAL SHOULDERS AFTER PT        PREVIOUS INFO:PREVIOUS INFO:  Right carpal tunnel him 04/20/2022 and left carpal tunnel 12/05/2022        PREVIOUS INFO:  EMG is [facility] dated 10/13/2022  Severe right carpal tunnel  Moderate left carpal tunnel  Mild right ulnar nerve neuropathy  Mild chronic bilateral C7, see 8/T1 radiculopathy without ongoing denervation     HISTORY:   12/20/2022    Praful Esposito III  is a 72 y.o. 2 weeks out left carpal tunnel release 8 weeks out right carpal tunnel release but really wants look at his shoulders today bilateral shoulder pain he remembers after a long bus trip in aching hurting no particular injury right left about equal in severity 1 day once worse next day the other is the worst he is right-handed         HISTORY:   1/17/2023    Praful Esposito III  is a 72 y.o. after bilateral MRIs patient was sent to formal physical therapy with follow-up in 4 weeks.  For bilateral shoulder pain please see MRIs below therapies helped but it he reaggravated little bit he is going to trip so wants to continue therapy will do that check on him in 6 weeks we will place him on Mobic stop all other anti-inflammatories and continue therapy      PAST MEDICAL HISTORY:   Past Medical History:   Diagnosis Date    Acute bronchitis     Benign prostatic hyperplasia with lower urinary tract symptoms     Bladder neck obstruction     Chronic prostatitis     Dysplasia of prostate     Elevated PSA     Fatigue     Urinary tract infection           PAST SURGICAL HISTORY:   Past Surgical History:   Procedure Laterality Date    BACK SURGERY      CARPAL TUNNEL RELEASE Right 10/24/2022    Procedure: RELEASE, CARPAL TUNNEL;  Surgeon: Jose Pickard III, MD;  Location: [facility];  Service: Orthopedics;  Laterality: Right;    CARPAL TUNNEL RELEASE Left 12/5/2022    Procedure: RELEASE, CARPAL TUNNEL;  Surgeon: Jose  ABHILASH Pickard III, MD;  Location: [facility];  Service: Orthopedics;  Laterality: Left;    TONSILLECTOMY      TRUS Biopsy of prostate            ALLERGIES: Review of patient's allergies indicates:  No Known Allergies     MEDICATIONS :    Current Outpatient Medications:     diclofenac sodium (SOLARAZE) 3 % gel, Apply topically 2 (two) times daily., Disp: , Rfl:     diphenhydrAMINE (SOMINEX) 25 mg tablet, Take 25 mg by mouth nightly as needed for Insomnia., Disp: , Rfl:     esomeprazole (NEXIUM) 40 MG capsule, Take 1 capsule (40 mg total) by mouth before breakfast., Disp: 90 capsule, Rfl: 3    HYDROcodone-acetaminophen (NORCO) 5-325 mg per tablet, Take 1 tablet by mouth every 6 (six) hours as needed for Pain. (Patient not taking: Reported on 2022), Disp: 12 tablet, Rfl: 0    HYDROcodone-acetaminophen (NORCO) 5-325 mg per tablet, Take 1 tablet by mouth every 4 (four) hours as needed for Pain. (Patient not taking: Reported on 2022), Disp: 20 tablet, Rfl: 0    ibuprofen (ADVIL,MOTRIN) 400 MG tablet, Take 400 mg by mouth nightly as needed for Other., Disp: , Rfl:     methocarbamol (ROBAXIN-750 ORAL), Take 2 tablets by mouth as needed., Disp: , Rfl:      SOCIAL HISTORY:   Social History     Socioeconomic History    Marital status:    Tobacco Use    Smoking status: Former     Packs/day: 0.50     Years: 3.00     Pack years: 1.50     Types: Cigarettes     Quit date:      Years since quittin.0    Smokeless tobacco: Former     Types: Chew   Substance and Sexual Activity    Alcohol use: Not Currently     Comment: occassionaly    Drug use: Never    Sexual activity: Yes     Partners: Female     Birth control/protection: None        ROS    FAMILY HISTORY:   Family History   Problem Relation Age of Onset    Clotting disorder Other     Cancer Other     Dementia Other     Heart disease Other     Cancer Father           PHYSICAL EXAM: There were no vitals filed for this visit.            There is no  height or weight on file to calculate BMI.     In general, this is a well-developed, well-nourished male . The patient is alert, oriented and cooperative.      HEENT:  Normocephalic, atraumatic.  Extraocular movements are intact bilaterally.  The oropharynx is benign.       NECK:  Nontender with good range of motion.      PULMONARY: Respirations are even and non-labored.       CARDIOVASCULAR: Pulses regular by peripheral palpation.       ABDOMEN:  Soft, non-tender, non-distended.        EXTREMITIES:  On exam today pinch min testing abduct abduction appears cause more pain than then impingement 1 and 2 he is good external rotation strength bilaterally    Ortho Exam      RADIOGRAPHIC FINDINGS:      MRI right shoulder [facility] dated 12/20/2022 discrete full-thickness tear involving the anterior aspect of the supraspinatus tendon no muscle atrophy I feel like this could be a partial injury with impingement    MRI of left shoulder dated 12/20/2022 severe degenerative changes are tendinitis involving the rotator cuff prominent AC joint the downsloping acromion with some resultant bursitis partial-thickness tearing of the subscap probable partial injury to the biceps tendon no complete tear seen some degenerative changes of the labrum in addition      .      IMPRESSION:  Bilateral shoulder pain therapy has helped some we will add Mobic DC all the anti-inflammatories have good vacation    PLAN:  Follow-up 6 weeks  There are no Patient Instructions on file for this visit.      No follow-ups on file.         Jose Pickard III      (Subject to voice recognition error, transcription service not allowed)

## 2023-01-23 ENCOUNTER — OFFICE VISIT (OUTPATIENT)
Dept: FAMILY MEDICINE | Facility: CLINIC | Age: 73
End: 2023-01-23
Payer: MEDICARE

## 2023-01-23 VITALS
WEIGHT: 182 LBS | SYSTOLIC BLOOD PRESSURE: 169 MMHG | HEART RATE: 62 BPM | BODY MASS INDEX: 26.96 KG/M2 | HEIGHT: 69 IN | DIASTOLIC BLOOD PRESSURE: 82 MMHG

## 2023-01-23 DIAGNOSIS — S61.213A LACERATION WITHOUT FOREIGN BODY OF LEFT MIDDLE FINGER WITHOUT DAMAGE TO NAIL, INITIAL ENCOUNTER: ICD-10-CM

## 2023-01-23 DIAGNOSIS — Z23 IMMUNIZATION DUE: ICD-10-CM

## 2023-01-23 DIAGNOSIS — T14.8XXA PUNCTURE WOUND: Primary | ICD-10-CM

## 2023-01-23 PROCEDURE — 99212 OFFICE O/P EST SF 10 MIN: CPT | Mod: ,,, | Performed by: NURSE PRACTITIONER

## 2023-01-23 PROCEDURE — 90471 TDAP VACCINE GREATER THAN OR EQUAL TO 7YO IM: ICD-10-PCS | Mod: AT,,, | Performed by: NURSE PRACTITIONER

## 2023-01-23 PROCEDURE — 99212 PR OFFICE/OUTPT VISIT, EST, LEVL II, 10-19 MIN: ICD-10-PCS | Mod: ,,, | Performed by: NURSE PRACTITIONER

## 2023-01-23 PROCEDURE — 90715 TDAP VACCINE GREATER THAN OR EQUAL TO 7YO IM: ICD-10-PCS | Mod: AT,,, | Performed by: NURSE PRACTITIONER

## 2023-01-23 PROCEDURE — 90715 TDAP VACCINE 7 YRS/> IM: CPT | Mod: AT,,, | Performed by: NURSE PRACTITIONER

## 2023-01-23 PROCEDURE — 90471 IMMUNIZATION ADMIN: CPT | Mod: AT,,, | Performed by: NURSE PRACTITIONER

## 2023-01-23 NOTE — PROGRESS NOTES
"Subjective:       Patient ID: Praful Esposito III is a 72 y.o. male.    Chief Complaint: staple stuck in left middle finger (Was able to take out/Needs tetanus shot)    HPI    Patient presents requesting tetanus shot  Clean staple pierced skin of middle finger of left hand earlier today; Area clean, covered with bandaid    BP (!) 169/82   Pulse 62   Ht 5' 9" (1.753 m)   Wt 82.6 kg (182 lb)   BMI 26.88 kg/m²     Medication List with Changes/Refills   Current Medications    DICLOFENAC SODIUM (SOLARAZE) 3 % GEL    Apply topically 2 (two) times daily.    DIPHENHYDRAMINE (SOMINEX) 25 MG TABLET    Take 25 mg by mouth nightly as needed for Insomnia.    ESOMEPRAZOLE (NEXIUM) 40 MG CAPSULE    Take 1 capsule (40 mg total) by mouth before breakfast.    HYDROCODONE-ACETAMINOPHEN (NORCO) 5-325 MG PER TABLET    Take 1 tablet by mouth every 6 (six) hours as needed for Pain.    HYDROCODONE-ACETAMINOPHEN (NORCO) 5-325 MG PER TABLET    Take 1 tablet by mouth every 4 (four) hours as needed for Pain.    IBUPROFEN (ADVIL,MOTRIN) 400 MG TABLET    Take 400 mg by mouth nightly as needed for Other.    MELOXICAM (MOBIC) 7.5 MG TABLET    Take 1 tablet (7.5 mg total) by mouth once daily.    METHOCARBAMOL (ROBAXIN-750 ORAL)    Take 2 tablets by mouth as needed.       Review of Systems   Constitutional:  Negative for chills and fever.   Respiratory:  Negative for cough.    Cardiovascular:  Negative for chest pain.   Neurological:  Negative for headaches.       Objective:      Physical Exam  Vitals and nursing note reviewed.   Constitutional:       General: He is not in acute distress.     Appearance: Normal appearance.   HENT:      Head: Normocephalic.   Eyes:      Conjunctiva/sclera: Conjunctivae normal.   Neck:      Thyroid: No thyromegaly or thyroid tenderness.      Trachea: Trachea normal.   Cardiovascular:      Rate and Rhythm: Normal rate and regular rhythm.      Pulses: Normal pulses.      Heart sounds: Normal heart sounds.   Pulmonary: "      Effort: Pulmonary effort is normal. No respiratory distress.      Breath sounds: Normal breath sounds.   Musculoskeletal:      Cervical back: Neck supple.   Skin:     General: Skin is warm and dry.   Neurological:      Mental Status: He is alert and oriented to person, place, and time.   Psychiatric:         Mood and Affect: Mood normal.         Behavior: Behavior normal.       Assessment:       1. Puncture wound    2. Immunization due    3. Laceration without foreign body of left middle finger without damage to nail, initial encounter        Plan:       Patient Instructions   Updated TDAP vaccine today     Monitor for s/s of infection; Keep area clean and dry     Puncture wound  -     (In Office Administered) Tdap Vaccine    Immunization due  -     (In Office Administered) Tdap Vaccine    Laceration without foreign body of left middle finger without damage to nail, initial encounter  -     (In Office Administered) Tdap Vaccine

## 2023-02-23 ENCOUNTER — OFFICE VISIT (OUTPATIENT)
Dept: ORTHOPEDICS | Facility: CLINIC | Age: 73
End: 2023-02-23
Payer: MEDICARE

## 2023-02-23 DIAGNOSIS — M25.512 CHRONIC PAIN OF BOTH SHOULDERS: Primary | ICD-10-CM

## 2023-02-23 DIAGNOSIS — M25.511 CHRONIC PAIN OF BOTH SHOULDERS: Primary | ICD-10-CM

## 2023-02-23 DIAGNOSIS — G89.29 CHRONIC PAIN OF BOTH SHOULDERS: Primary | ICD-10-CM

## 2023-02-23 PROCEDURE — 99212 OFFICE O/P EST SF 10 MIN: CPT | Mod: PBBFAC | Performed by: ORTHOPAEDIC SURGERY

## 2023-02-23 PROCEDURE — 99212 PR OFFICE/OUTPT VISIT, EST, LEVL II, 10-19 MIN: ICD-10-PCS | Mod: S$PBB,,, | Performed by: ORTHOPAEDIC SURGERY

## 2023-02-23 PROCEDURE — 99212 OFFICE O/P EST SF 10 MIN: CPT | Mod: S$PBB,,, | Performed by: ORTHOPAEDIC SURGERY

## 2023-02-23 NOTE — PROGRESS NOTES
CC:   Chief Complaint   Patient presents with    Follow-up     RECHECK BILATERAL SHOULDERS          PREVIOUS INFO:PREVIOUS INFO:  Right carpal tunnel him 04/20/2022 and left carpal tunnel 12/05/2022        PREVIOUS INFO:  EMG is [facility] dated 10/13/2022  Severe right carpal tunnel  Moderate left carpal tunnel  Mild right ulnar nerve neuropathy  Mild chronic bilateral C7, see 8/T1 radiculopathy without ongoing denervation     HISTORY:   12/20/2022    Praful Esposito III  is a 72 y.o. 2 weeks out left carpal tunnel release 8 weeks out right carpal tunnel release but really wants look at his shoulders today bilateral shoulder pain he remembers after a long bus trip in [facility] hurting no particular injury right left about equal in severity 1 day once worse next day the other is the worst he is right-handed           HISTORY:   1/17/2023    Praful Esposito III  is a 72 y.o. after bilateral MRIs patient was sent to formal physical therapy with follow-up in 4 weeks.  For bilateral shoulder pain please see MRIs below therapies helped but it he reaggravated little bit he is going to trip so wants to continue therapy will do that check on him in 6 weeks we will place him on Mobic stop all other anti-inflammatories and continue therapy         HISTORY:   2/23/2023    Praful Esposito III  is a 72 y.o. right worse than left but neither 1 really gives him pain at night he is lost motion internal rotation on the right      PAST MEDICAL HISTORY:   Past Medical History:   Diagnosis Date    Acute bronchitis     Benign prostatic hyperplasia with lower urinary tract symptoms     Bladder neck obstruction     Chronic prostatitis     Dysplasia of prostate     Elevated PSA     Fatigue     Urinary tract infection           PAST SURGICAL HISTORY:   Past Surgical History:   Procedure Laterality Date    BACK SURGERY      CARPAL TUNNEL RELEASE Right 10/24/2022    Procedure: RELEASE, CARPAL TUNNEL;  Surgeon: Jose Pickard  III, MD;  Location: [facility];  Service: Orthopedics;  Laterality: Right;    CARPAL TUNNEL RELEASE Left 2022    Procedure: RELEASE, CARPAL TUNNEL;  Surgeon: Jose Pickard III, MD;  Location: [facility];  Service: Orthopedics;  Laterality: Left;    TONSILLECTOMY      TRUS Biopsy of prostate            ALLERGIES: Review of patient's allergies indicates:  No Known Allergies     MEDICATIONS :    Current Outpatient Medications:     diclofenac sodium (SOLARAZE) 3 % gel, Apply topically 2 (two) times daily., Disp: , Rfl:     diphenhydrAMINE (SOMINEX) 25 mg tablet, Take 25 mg by mouth nightly as needed for Insomnia., Disp: , Rfl:     esomeprazole (NEXIUM) 40 MG capsule, Take 1 capsule (40 mg total) by mouth before breakfast., Disp: 90 capsule, Rfl: 3    HYDROcodone-acetaminophen (NORCO) 5-325 mg per tablet, Take 1 tablet by mouth every 6 (six) hours as needed for Pain. (Patient not taking: Reported on 2022), Disp: 12 tablet, Rfl: 0    HYDROcodone-acetaminophen (NORCO) 5-325 mg per tablet, Take 1 tablet by mouth every 4 (four) hours as needed for Pain. (Patient not taking: Reported on 2022), Disp: 20 tablet, Rfl: 0    ibuprofen (ADVIL,MOTRIN) 400 MG tablet, Take 400 mg by mouth nightly as needed for Other., Disp: , Rfl:     meloxicam (MOBIC) 7.5 MG tablet, Take 1 tablet (7.5 mg total) by mouth once daily., Disp: 30 tablet, Rfl: 1    methocarbamol (ROBAXIN-750 ORAL), Take 2 tablets by mouth as needed., Disp: , Rfl:      SOCIAL HISTORY:   Social History     Socioeconomic History    Marital status:    Tobacco Use    Smoking status: Former     Packs/day: 0.50     Years: 3.00     Pack years: 1.50     Types: Cigarettes     Quit date: 1969     Years since quittin.1    Smokeless tobacco: Former     Types: Chew   Substance and Sexual Activity    Alcohol use: Not Currently     Comment: occassionaly    Drug use: Never    Sexual activity: Yes     Partners: Female     Birth  control/protection: None        ROS    FAMILY HISTORY:   Family History   Problem Relation Age of Onset    Clotting disorder Other     Cancer Other     Dementia Other     Heart disease Other     Cancer Father           PHYSICAL EXAM: There were no vitals filed for this visit.            There is no height or weight on file to calculate BMI.     In general, this is a well-developed, well-nourished male . The patient is alert, oriented and cooperative.      HEENT:  Normocephalic, atraumatic.  Extraocular movements are intact bilaterally.  The oropharynx is benign.       NECK:  Nontender with good range of motion.      PULMONARY: Respirations are even and non-labored.       CARDIOVASCULAR: Pulses regular by peripheral palpation.       ABDOMEN:  Soft, non-tender, non-distended.        EXTREMITIES:  Right upper extremity pinch min testing 1 2 abduction really do not cause pain internal rotation he can get to about L2 or 3 it is decreased compared to the opposite side    Ortho Exam      RADIOGRAPHIC FINDINGS:    MRI right shoulder [facility] dated 12/20/2022 discrete full-thickness tear involving the anterior aspect of the supraspinatus tendon no muscle atrophy I feel like this could be a partial injury with impingement     MRI of left shoulder dated 12/20/2022 severe degenerative changes are tendinitis involving the rotator cuff prominent AC joint the downsloping acromion with some resultant bursitis partial-thickness tearing of the subscap probable partial injury to the biceps tendon no complete tear seen some degenerative changes of the labrum in addition       .      IMPRESSION: Chronic pain of both shoulders     See MRIs above he has some injury bilaterally but impingement testing 1 2 abduction really do not cause pain he does have loss of internal rotation went over stretching program for that he is on do that right now rather than surgery    PLAN:  Rehab program for his shoulder see how he does be glad  to see him back he starts getting worse he is not interested in having anything done currently  There are no Patient Instructions on file for this visit.      No follow-ups on file.         Jose Pickard III      (Subject to voice recognition error, transcription service not allowed)

## 2023-03-02 ENCOUNTER — OFFICE VISIT (OUTPATIENT)
Dept: INTERNAL MEDICINE | Facility: CLINIC | Age: 73
End: 2023-03-02
Payer: MEDICARE

## 2023-03-02 VITALS
HEART RATE: 68 BPM | SYSTOLIC BLOOD PRESSURE: 147 MMHG | BODY MASS INDEX: 26.96 KG/M2 | WEIGHT: 182 LBS | RESPIRATION RATE: 20 BRPM | HEIGHT: 69 IN | OXYGEN SATURATION: 100 % | TEMPERATURE: 98 F | DIASTOLIC BLOOD PRESSURE: 65 MMHG

## 2023-03-02 DIAGNOSIS — Z09 FOLLOW-UP EXAM: Primary | ICD-10-CM

## 2023-03-02 DIAGNOSIS — H91.90 HEARING LOSS, UNSPECIFIED HEARING LOSS TYPE, UNSPECIFIED LATERALITY: ICD-10-CM

## 2023-03-02 DIAGNOSIS — Z12.11 ENCOUNTER FOR SCREENING COLONOSCOPY: ICD-10-CM

## 2023-03-02 DIAGNOSIS — K21.9 GASTROESOPHAGEAL REFLUX DISEASE, UNSPECIFIED WHETHER ESOPHAGITIS PRESENT: ICD-10-CM

## 2023-03-02 DIAGNOSIS — R03.0 ELEVATED BLOOD PRESSURE READING: ICD-10-CM

## 2023-03-02 DIAGNOSIS — N40.0 BENIGN PROSTATIC HYPERPLASIA, UNSPECIFIED WHETHER LOWER URINARY TRACT SYMPTOMS PRESENT: ICD-10-CM

## 2023-03-02 PROCEDURE — 99214 PR OFFICE/OUTPT VISIT, EST, LEVL IV, 30-39 MIN: ICD-10-PCS | Mod: S$PBB,,, | Performed by: INTERNAL MEDICINE

## 2023-03-02 PROCEDURE — 99215 OFFICE O/P EST HI 40 MIN: CPT | Mod: PBBFAC | Performed by: INTERNAL MEDICINE

## 2023-03-02 PROCEDURE — 99214 OFFICE O/P EST MOD 30 MIN: CPT | Mod: S$PBB,,, | Performed by: INTERNAL MEDICINE

## 2023-03-02 RX ORDER — TADALAFIL 10 MG/1
10 TABLET ORAL DAILY PRN
COMMUNITY

## 2023-03-02 RX ORDER — ESOMEPRAZOLE MAGNESIUM 40 MG/1
40 CAPSULE, DELAYED RELEASE ORAL
Qty: 90 CAPSULE | Refills: 3 | Status: SHIPPED | OUTPATIENT
Start: 2023-03-02 | End: 2023-09-11 | Stop reason: SDUPTHER

## 2023-03-02 NOTE — PROGRESS NOTES
Subjective:       Patient ID: Praful Esposito III is a 72 y.o. male.    Chief Complaint: Follow-up (Scheduled for lower GI, pt stated it was coded for upper and not lower. Wanting to know if still needed to be completed )    The patient is a 71-year-old white male the presents today for follow-up.  History of GERD and BPH.  At his last visit he complained of cough and wheezing.  Chest x-ray showed no acute process.  COVID swab was negative.  We prescribed him a Ventolin inhaler and his cough has resolved.  No complaints today.  His systolic blood pressure is  Elevated.  No history of hypertension.  He is currently resting comfortably in no distress.  He is afebrile and other than systolic blood pressure vital signs are stable.  He states systolic blood pressure normally runs in the 130s to 140s.    9/8-the patient presents today for follow-up.  No further issues with cough.  His blood pressure is elevated again today at 160/70.  We had him keep a blood pressure log last time and blood pressures were at target.  He forgot his blood pressure log that he was going to bring in with him today.  He states that systolics have been running in the 130s and 140s.  He complains of some bilateral groin pain that happened after doing some stretching.  No numbness or tingling.  States that he also had what looked like acne on his face that comes and goes.  He has an appointment with Dermatology in October.  Since it has been greater than 10 years since he has had a colonoscopy.  His father had colon cancer.  He also needs refill on his Nexium.  He is resting comfortably today in no distress.  Outside of the blood pressure his vital signs are stable.    3/2/23- Patient presents today for follow up. He denies any complaints at this visit. He reports having bilateral carpel tunnel release since his last visit with improvement in symptoms. BP today is 147/65. He reports that he does routinely monitor his BP at home and his systolic BP  ranges from 130-140s. He denies any issues with his reflux but does request a refill on his Nexium. Since the last visit, he was unable to get scheduled for his colonoscopy. Patient appears in no distress at the time of the exam. BP elevated and other vital signs are stable.    Follow-up  Associated symptoms include arthralgias. Pertinent negatives include no abdominal pain, chest pain, chills, coughing, fatigue, fever, headaches, joint swelling, myalgias, nausea, neck pain, rash, sore throat, vomiting or weakness.   Review of Systems   Constitutional:  Negative for activity change, appetite change, chills, fatigue, fever and unexpected weight change.   HENT:  Negative for ear pain, hearing loss, rhinorrhea, sinus pressure/congestion, sore throat and trouble swallowing.    Eyes:  Negative for pain, discharge, redness and visual disturbance.   Respiratory:  Negative for apnea, cough, chest tightness, shortness of breath and wheezing.    Cardiovascular:  Negative for chest pain, palpitations and leg swelling.   Gastrointestinal:  Negative for abdominal pain, blood in stool, constipation, diarrhea, nausea and vomiting.   Endocrine: Negative for cold intolerance, heat intolerance, polydipsia and polyuria.   Genitourinary:  Negative for difficulty urinating, dysuria, hematuria and urgency.   Musculoskeletal:  Positive for arthralgias. Negative for back pain, joint swelling, myalgias and neck pain.   Integumentary:  Negative for pallor and rash.   Allergic/Immunologic: Negative for frequent infections.   Neurological:  Negative for tremors, seizures, weakness and headaches.   Hematological:  Negative for adenopathy.   Psychiatric/Behavioral:  Negative for confusion, dysphoric mood and sleep disturbance. The patient is not nervous/anxious.        Objective:      Physical Exam  Vitals and nursing note reviewed.   Constitutional:       General: He is not in acute distress.     Appearance: Normal appearance. He is not  ill-appearing.   HENT:      Head: Normocephalic and atraumatic.      Right Ear: External ear normal.      Left Ear: External ear normal.      Nose: Nose normal.      Mouth/Throat:      Pharynx: Oropharynx is clear.   Eyes:      Extraocular Movements: Extraocular movements intact.      Conjunctiva/sclera: Conjunctivae normal.      Pupils: Pupils are equal, round, and reactive to light.   Neck:      Vascular: No carotid bruit.   Cardiovascular:      Rate and Rhythm: Normal rate and regular rhythm.      Pulses: Normal pulses.      Heart sounds: Normal heart sounds. No murmur heard.  Pulmonary:      Effort: No respiratory distress.      Breath sounds: No wheezing or rales.   Abdominal:      General: Bowel sounds are normal. There is no distension.      Palpations: Abdomen is soft.   Musculoskeletal:         General: Normal range of motion.      Cervical back: Normal range of motion and neck supple.      Right lower leg: No edema.      Left lower leg: No edema.   Skin:     General: Skin is warm and dry.      Capillary Refill: Capillary refill takes less than 2 seconds.      Coloration: Skin is not pale.   Neurological:      General: No focal deficit present.      Mental Status: He is alert and oriented to person, place, and time.      Cranial Nerves: No cranial nerve deficit.      Sensory: No sensory deficit.      Motor: No weakness.   Psychiatric:         Mood and Affect: Mood normal.         Judgment: Judgment normal.       Assessment:       1. Follow-up exam        Plan:       1. The patient presents today for follow-up.  Lab work has been done within the last 6 months.  There was some issues with the referral we made for colonoscopy so we will resend that.  Since we last saw him he had bilateral carpal tunnel procedures done in his hands are doing better.  He is also undergone physical therapy for some partial tears in his shoulder.  This has made it manageable    2. Hearing loss-we will make a referral to audiology  for a formal hearing test  9/8-no acute issues.  According to his wife he did have the testing done    3. Elevated blood pressure reading-no diagnosis hypertension.  Systolic blood pressure 160 today.  He states that it normally runs in the 130s to 140s.  His last blood pressure log shows systolics in the 130s to 140s.  I think he likely has a component of white coat hypertension.  He left his most recent blood pressure log at home but will get that back to us.  3/2/23-he certainly has a component of white coat hypertension.  Systolics in the 130s away from here.  Today it is 147/65.  We will continue to monitor    4. BPH-stable.  PSA 8.64 down from 9.13.  He follows with Urology--biopsy and MRI both negative    5. GERD-stable on Nexium    6. Rash-comes and goes.  On the face.  Rosacea?  He has an appointment scheduled with Dermatology in October.  3/2/23-he had follow-up with Dermatology in his issues have resolved    7. Erectile dysfunction-testosterone is in normal range but towards the bottom of normal.  Tadalafil p.r.n..  He has inquired about hormone replacement therapy.  I will do some research and get back with a    Return to care in 6 months or sooner if needed.  We will repeat labs at next visit

## 2023-03-08 DIAGNOSIS — Z12.11 SCREENING FOR COLON CANCER: Primary | ICD-10-CM

## 2023-03-09 DIAGNOSIS — Z71.89 COMPLEX CARE COORDINATION: ICD-10-CM

## 2023-03-17 ENCOUNTER — PATIENT MESSAGE (OUTPATIENT)
Dept: RESEARCH | Facility: HOSPITAL | Age: 73
End: 2023-03-17

## 2023-05-23 PROCEDURE — 88342 IMHCHEM/IMCYTCHM 1ST ANTB: CPT | Mod: 26,,, | Performed by: PATHOLOGY

## 2023-05-23 PROCEDURE — 88342 PATHOLOGY, DERMATOLOGY: ICD-10-PCS | Mod: 26,,, | Performed by: PATHOLOGY

## 2023-05-23 PROCEDURE — 88305 PATHOLOGY, DERMATOLOGY: ICD-10-PCS | Mod: 26,,, | Performed by: PATHOLOGY

## 2023-05-23 PROCEDURE — 88305 TISSUE EXAM BY PATHOLOGIST: CPT | Mod: TC,SUR | Performed by: DERMATOLOGY

## 2023-05-23 PROCEDURE — 88305 TISSUE EXAM BY PATHOLOGIST: CPT | Mod: 26,,, | Performed by: PATHOLOGY

## 2023-05-24 ENCOUNTER — LAB REQUISITION (OUTPATIENT)
Dept: LAB | Facility: HOSPITAL | Age: 73
End: 2023-05-24
Attending: DERMATOLOGY
Payer: MEDICARE

## 2023-05-24 DIAGNOSIS — D49.2 NEOPLASM OF UNSPECIFIED BEHAVIOR OF BONE, SOFT TISSUE, AND SKIN: ICD-10-CM

## 2023-05-25 LAB
DHEA SERPL-MCNC: NORMAL
ESTROGEN SERPL-MCNC: NORMAL PG/ML
INSULIN SERPL-ACNC: NORMAL U[IU]/ML
LAB AP GROSS DESCRIPTION: NORMAL
LAB AP LABORATORY NOTES: NORMAL
LAB AP SPEC A DDX: NORMAL
LAB AP SPEC A MORPHOLOGY: NORMAL
LAB AP SPEC A PROCEDURE: NORMAL
T3RU NFR SERPL: NORMAL %

## 2023-06-13 ENCOUNTER — OFFICE VISIT (OUTPATIENT)
Dept: UROLOGY | Facility: CLINIC | Age: 73
End: 2023-06-13
Payer: MEDICARE

## 2023-06-13 VITALS
WEIGHT: 186 LBS | DIASTOLIC BLOOD PRESSURE: 71 MMHG | HEART RATE: 62 BPM | HEIGHT: 69 IN | SYSTOLIC BLOOD PRESSURE: 125 MMHG | BODY MASS INDEX: 27.55 KG/M2

## 2023-06-13 DIAGNOSIS — N32.0 BLADDER OUTLET OBSTRUCTION: ICD-10-CM

## 2023-06-13 DIAGNOSIS — N40.1 BENIGN PROSTATIC HYPERPLASIA WITH URINARY OBSTRUCTION: ICD-10-CM

## 2023-06-13 DIAGNOSIS — N13.8 BENIGN PROSTATIC HYPERPLASIA WITH URINARY OBSTRUCTION: ICD-10-CM

## 2023-06-13 DIAGNOSIS — N41.1 CHRONIC PROSTATITIS: ICD-10-CM

## 2023-06-13 DIAGNOSIS — R97.20 ELEVATED PSA: Primary | ICD-10-CM

## 2023-06-13 DIAGNOSIS — N42.30 DYSPLASIA OF PROSTATE: ICD-10-CM

## 2023-06-13 PROCEDURE — 99213 OFFICE O/P EST LOW 20 MIN: CPT | Mod: S$PBB,,, | Performed by: UROLOGY

## 2023-06-13 PROCEDURE — 99214 OFFICE O/P EST MOD 30 MIN: CPT | Mod: PBBFAC | Performed by: UROLOGY

## 2023-06-13 PROCEDURE — 99213 PR OFFICE/OUTPT VISIT, EST, LEVL III, 20-29 MIN: ICD-10-PCS | Mod: S$PBB,,, | Performed by: UROLOGY

## 2023-06-13 NOTE — PROGRESS NOTES
Subjective     Patient ID: Praful Esposito III is a 73 y.o. male.    Chief Complaint: Elevated PSA (6 month PSA r97.20 and office visit)       History of Present Illness  Mr. Esposito is now 64 years old. He is seen for the first time as a clinic patient in nearly 5 years. He has been seen at yearly screens and has had PSAs done virtually every year. He has a relatively high normal PSA. He has had some increase in lower tract obstructive symptoms. He does not void as well compared to what he used to. He has not on any regular medications and does not feel he needs anything yet to help with the urine flow. No new health issues that he is aware of but he has not had any blood work in several years and desires general check up also. Does not have primary physician currently but has used Dr. Gonzalez in the past.  (April 1, 2015)     Mr. Esposito's PSA has continued to go up. It was 4.71 at recent prostate screening in September. We had recommended that he have prostate biopsies and he comes in today to have these done. Clinically unchanged. He does have mild-to-moderate lower tract obstructive symptoms.  (November 19 2015)     Mr. Esposito is in for follow-up of PSA elevation. 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 Prostate size was 50 mL. He has not been having a good stream and has trouble emptying and has slowing of his stream. He desires no medication to help with that at this time. He has had a good 6 months overall. (July 26, 2016)     Mr. Esposito is in for his 6 month follow-up for PSA elevation. Biopsies slightly over a year ago did not show any suggestion of cancer but did have high-grade PIN and continued follow-up indicated. He is doing okay clinically with no worsening bladder outlet obstructive symptoms. PSA is down slightly to 4.460.  (January 31, 2017)     Mr. Esposito is in for 6 month follow-up of chronic PSA elevation. Clinically doing okay with no  worsening bladder outlet obstructive symptoms or other difficulty. (August 1, 2017)     Mr. Esposito had his MRI of the prostate done on September 12, 2017. The size of the gland was 61 mL. The patient had  no suspicious lesions identified and no biopsy was felt to be indicated. Returned today for follow-up PSA and recheck. He's had no significant increase in voiding symptoms with relatively mild bladder outlet obstructive problems. No new health issues. Recently retired from [employer]. (February 6, 2018)     Mr. Esposito is in for his 6 month follow-up for PSA elevation. Clinically doing okay without any worsening bladder outlet obstructive symptoms. Typically nocturia x1. He takes no medications. Overall satisfactory 6 months. (August 7, 2018)     Mr. Esposito is in for first time in a year. He missed his last appointment because he was getting over back surgery that he had in December. He had that done in [facility]. He is doing fairly well with voiding with some slight hesitancy and usually nocturia x1. He does not want any pharmacologic help with the way he is voiding. (October 31, 2019)     Mr. Esposito is in for first visit in a year. He has had chronic PSA elevation for several years. He's had previous biopsies and had previous MRI of the prostate done at [place]. Clinically doing okay with nocturia one to 2 times nightly. He's had no new health problems this year feels he is stable clinically. (November 9, 2020)     PSA was 7.510 on 12/14/2020  PSA was 7.810 on November 9, 2020  PSA was 5.740 on October 31, 2019   PSA was 6.280 on  August 7, 2018  PSA was 5.050 on February 6, 2018  PSA was 6.650 on August 1, 2017  PSA was 4.460 on 1/31/2017  PSA was 4.970 on July 26, 2016  PSA was 4.71 on September 22, 2015  PSA was 3.630 on April 1, 2015, Past PSA Results   PSA was 2.89 on September 13, 2011  2.03 on September 22, 2009  2.54 on September 23, 2008  1.88 on September 18, 2007  2.59 on September 19, 2006  1.92 on  September 20, 2005  1.3 on September 16, 2003  1.5 on September 27, 2000  -------------------------------------------------------------------------------------------------------------------------------------------------------------------------------------------------------------------  -The above notes are from the old electronic medical record--------------------------------------------------------------------     PSA was 9.130 on November 16, 2021  PSA was 8.640 on June 13, 2022  PSA was 8.630 on December 13, 2022  PSA was 8.960 on June 13, 2023     Mr. Esposito is in for 1st visit in a year.  He has chronic PSA elevation.  PSA pending while he was in the office.  He feels he is stable with no change with nocturia only 1 time nightly.  He has been followed for PSA elevation for over 10 years.  No worsening bladder outlet obstructive symptoms.  Typically nocturia only 1 time nightly like he has had for the last few years.  He had a MRI of prostate at [place] in September 2017. Had biopsies done in 2015. General health has been good and he has had no other health issues. (November 15, 2021)          Mr. Esposito  was in for his MRI of the prostate and follow-up of his PSA elevation that had actually gone to the highest level it has ever been on his PSA done last month.  No worsening bladder outlet obstructive symptoms. (December 9, 2021)     Mr. Esposito is in for his 6 month follow-up of chronic PSA elevation.  He feels he is stable with voiding with nocturia x1 on no medications.  MRI was a PI-RADS 2 with 57 mL prostate.  He does not feel he needs any help with voiding at present and has had a good 6 months.   PSA drawn and pending.  (June 13, 2022)     Mr. Esposito is in for his 6 month follow-up for PSA elevation.  He also wanted to get his testosterone and estradiol checked.  Energy level has not been as good as desirable.  He has had a lot of trouble with the shoulders recently and also had a carpal tunnel release  recently.  Nocturia 2 times nightly with no worsening bladder outlet obstructive symptoms.  2 lb weight loss from last visit.  He is having some major discomfort from his shoulders and may be forced to do something about that in the near future. [December 13, 2022]    Mr. Esposito is in for six-month follow-up of chronic PSA elevation.  He is doing okay clinically with no worsening bladder outlet obstructive symptoms.  Typically nocturia x1.  He had his PSA drawn this morning and results pending.  No new problems otherwise. [  June 13, 2023]     Review of Systems       Objective     Physical Exam  Constitutional:       Appearance: Normal appearance. He is normal weight.   Genitourinary:     Prostate: Normal.      Rectum: Normal.      Comments: Prostate 40 g smooth symmetrical and benign feeling  Neurological:      Mental Status: He is alert.   Psychiatric:         Mood and Affect: Mood normal.         Behavior: Behavior normal.       Urinalysis showed only occasional pus cells.  The dipstick had 1+ leukocytes with pH of 5.0 and specific gravity 1.020  Assessment and Plan     1. Elevated PSA  -     PSA, Total (Diagnostic); Future; Expected date: 12/13/2023    2. Dysplasia of prostate    3. Chronic prostatitis    4. Bladder outlet obstruction    5. Benign prostatic hyperplasia with urinary obstruction       Patient doing okay and desires no  pharmacologic help with voiding yet.   Patient given written prescription for Cialis 10 mg #100 with p.r.n. refill   PSA returned after he left and was 8.960.  He was notified of results by telephone.   6 month appointment with PSA.          Follow up in 6 months (on 12/13/2023).

## 2023-06-13 NOTE — PATIENT INSTRUCTIONS
Patient doing okay and desires no  pharmacologic help with voiding yet.   Patient given written prescription for Cialis 10 mg #100 with p.r.n. refill   PSA returned after he left and was 8.960.  He was notified of results by telephone.   6 month appointment with PSA.      Wednesday, December 13, 2023 at 1:45PM

## 2023-06-15 ENCOUNTER — ANESTHESIA (OUTPATIENT)
Dept: GASTROENTEROLOGY | Facility: HOSPITAL | Age: 73
End: 2023-06-15
Payer: MEDICARE

## 2023-06-15 ENCOUNTER — HOSPITAL ENCOUNTER (OUTPATIENT)
Dept: GASTROENTEROLOGY | Facility: HOSPITAL | Age: 73
Discharge: HOME | End: 2023-06-15
Attending: INTERNAL MEDICINE
Payer: MEDICARE

## 2023-06-15 ENCOUNTER — ANESTHESIA EVENT (OUTPATIENT)
Dept: GASTROENTEROLOGY | Facility: HOSPITAL | Age: 73
End: 2023-06-15
Payer: MEDICARE

## 2023-06-15 VITALS
OXYGEN SATURATION: 95 % | DIASTOLIC BLOOD PRESSURE: 49 MMHG | BODY MASS INDEX: 26.43 KG/M2 | WEIGHT: 179 LBS | RESPIRATION RATE: 16 BRPM | TEMPERATURE: 98 F | SYSTOLIC BLOOD PRESSURE: 96 MMHG | HEART RATE: 57 BPM

## 2023-06-15 DIAGNOSIS — D12.3 ADENOMATOUS POLYP OF TRANSVERSE COLON: ICD-10-CM

## 2023-06-15 DIAGNOSIS — D12.8 ADENOMATOUS POLYP OF RECTUM: ICD-10-CM

## 2023-06-15 DIAGNOSIS — Z80.0 FAMILY HISTORY OF COLON CANCER: Primary | ICD-10-CM

## 2023-06-15 DIAGNOSIS — K57.30 DIVERTICULOSIS OF COLON: ICD-10-CM

## 2023-06-15 DIAGNOSIS — D12.4 ADENOMATOUS POLYP OF DESCENDING COLON: ICD-10-CM

## 2023-06-15 DIAGNOSIS — Z12.11 SCREENING FOR COLON CANCER: ICD-10-CM

## 2023-06-15 PROCEDURE — 63600175 PHARM REV CODE 636 W HCPCS: Performed by: NURSE ANESTHETIST, CERTIFIED REGISTERED

## 2023-06-15 PROCEDURE — 37000009 HC ANESTHESIA EA ADD 15 MINS

## 2023-06-15 PROCEDURE — 45385 COLONOSCOPY W/LESION REMOVAL: CPT | Mod: PT | Performed by: INTERNAL MEDICINE

## 2023-06-15 PROCEDURE — 45385 PR COLONOSCOPY,REMV LESN,SNARE: ICD-10-PCS | Mod: PT,,, | Performed by: INTERNAL MEDICINE

## 2023-06-15 PROCEDURE — 27201423 OPTIME MED/SURG SUP & DEVICES STERILE SUPPLY

## 2023-06-15 PROCEDURE — D9220A PRA ANESTHESIA: ICD-10-PCS | Mod: PT,,, | Performed by: NURSE ANESTHETIST, CERTIFIED REGISTERED

## 2023-06-15 PROCEDURE — 25000003 PHARM REV CODE 250: Performed by: NURSE ANESTHETIST, CERTIFIED REGISTERED

## 2023-06-15 PROCEDURE — 88305 SURGICAL PATHOLOGY: ICD-10-PCS | Mod: 26,,, | Performed by: PATHOLOGY

## 2023-06-15 PROCEDURE — D9220A PRA ANESTHESIA: Mod: PT,,, | Performed by: NURSE ANESTHETIST, CERTIFIED REGISTERED

## 2023-06-15 PROCEDURE — 27000284 HC CANNULA NASAL: Performed by: NURSE ANESTHETIST, CERTIFIED REGISTERED

## 2023-06-15 PROCEDURE — 88305 TISSUE EXAM BY PATHOLOGIST: CPT | Mod: TC,SUR | Performed by: INTERNAL MEDICINE

## 2023-06-15 PROCEDURE — 37000008 HC ANESTHESIA 1ST 15 MINUTES

## 2023-06-15 PROCEDURE — 88305 TISSUE EXAM BY PATHOLOGIST: CPT | Mod: 26,,, | Performed by: PATHOLOGY

## 2023-06-15 PROCEDURE — 45385 COLONOSCOPY W/LESION REMOVAL: CPT | Mod: PT,,, | Performed by: INTERNAL MEDICINE

## 2023-06-15 RX ORDER — LIDOCAINE HYDROCHLORIDE 20 MG/ML
INJECTION INTRAVENOUS
Status: DISCONTINUED | OUTPATIENT
Start: 2023-06-15 | End: 2023-06-15

## 2023-06-15 RX ORDER — PROPOFOL 10 MG/ML
VIAL (ML) INTRAVENOUS CONTINUOUS PRN
Status: DISCONTINUED | OUTPATIENT
Start: 2023-06-15 | End: 2023-06-15

## 2023-06-15 RX ORDER — SODIUM CHLORIDE 0.9 % (FLUSH) 0.9 %
10 SYRINGE (ML) INJECTION
Status: DISCONTINUED | OUTPATIENT
Start: 2023-06-15 | End: 2023-06-16 | Stop reason: HOSPADM

## 2023-06-15 RX ADMIN — LIDOCAINE HYDROCHLORIDE 50 MG: 20 INJECTION, SOLUTION INTRAVENOUS at 08:06

## 2023-06-15 RX ADMIN — SODIUM CHLORIDE: 9 INJECTION, SOLUTION INTRAVENOUS at 08:06

## 2023-06-15 RX ADMIN — PROPOFOL 125 MCG/KG/MIN: 10 INJECTION, EMULSION INTRAVENOUS at 08:06

## 2023-06-15 NOTE — ANESTHESIA POSTPROCEDURE EVALUATION
Anesthesia Post Evaluation    Patient: Praful Esposito III    Procedure(s) Performed: * Colonoscopy *    [facility] Anesthesia Post Op Evaluation      Vitals Value Taken Time   /66 06/15/23 0933   Temp 98 06/15/23 1517   Pulse 61 06/15/23 0934   Resp 11 06/15/23 0934   SpO2 99 % 06/15/23 0924   Vitals shown include unvalidated device data.      Event Time   Out of Recovery 09:52:18         Pain/Gabriel Score: Gabriel Score: 8 (6/15/2023  9:06 AM)

## 2023-06-15 NOTE — ANESTHESIA PREPROCEDURE EVALUATION
06/15/2023  Praful Esposito III is a 73 y.o., male.    Past Medical History:   Diagnosis Date    Acute bronchitis     Benign prostatic hyperplasia with lower urinary tract symptoms     Bladder neck obstruction     Chronic prostatitis     Dysplasia of prostate     Elevated PSA     Fatigue     Urinary tract infection        Past Surgical History:   Procedure Laterality Date    BACK SURGERY      CARPAL TUNNEL RELEASE Right 10/24/2022    Procedure: RELEASE, CARPAL TUNNEL;  Surgeon: Jose Pickard III, MD;  Location: [facility] OR;  Service: Orthopedics;  Laterality: Right;    CARPAL TUNNEL RELEASE Left 2022    Procedure: RELEASE, CARPAL TUNNEL;  Surgeon: Jose Pickard III, MD;  Location: [facility] OR;  Service: Orthopedics;  Laterality: Left;    TONSILLECTOMY      TRUS Biopsy of prostate         Family History   Problem Relation Age of Onset    Clotting disorder Other     Cancer Other     Dementia Other     Heart disease Other     Cancer Father        Social History     Socioeconomic History    Marital status:    Tobacco Use    Smoking status: Former     Packs/day: 0.50     Years: 3.00     Pack years: 1.50     Types: Cigarettes     Quit date:      Years since quittin.4    Smokeless tobacco: Former     Types: Chew   Substance and Sexual Activity    Alcohol use: Not Currently     Comment: occassionaly    Drug use: Never    Sexual activity: Yes     Partners: Female     Birth control/protection: None       Current Outpatient Medications   Medication Sig Dispense Refill    diclofenac sodium (SOLARAZE) 3 % gel Apply topically 2 (two) times daily.      diphenhydrAMINE (SOMINEX) 25 mg tablet Take 25 mg by mouth nightly as needed for Insomnia.      esomeprazole (NEXIUM) 40 MG capsule Take 1 capsule (40 mg total) by mouth before breakfast. 90 capsule 3    ibuprofen  (ADVIL,MOTRIN) 400 MG tablet Take 400 mg by mouth nightly as needed for Other.      methocarbamol (ROBAXIN-750 ORAL) Take 2 tablets by mouth as needed.      tadalafiL (CIALIS) 10 MG tablet Take 10 mg by mouth daily as needed.       No current facility-administered medications for this encounter.       Review of patient's allergies indicates:  No Known Allergies  Patient Active Problem List   Diagnosis    Cough    Wheezing    Gastroesophageal reflux disease    Hearing loss    Elevated blood pressure reading    Benign prostatic hyperplasia    Carpal tunnel syndrome of right wrist    CTS (carpal tunnel syndrome)    Shoulder pain    Chronic pain of both shoulders       Pre-op Assessment    I have reviewed the Patient Summary Reports.     I have reviewed the Nursing Notes. I have reviewed the NPO Status.   I have reviewed the Medications.     Review of Systems  Anesthesia Hx:  No problems with previous Anesthesia    Cardiovascular:   Hypertension    Hepatic/GI:   GERD    Neurological:   Neuromuscular Disease,        Physical Exam  General: Well nourished, Alert, Oriented and Cooperative    Airway:  Mallampati: II   Mouth Opening: Normal  Neck ROM: Normal ROM    Dental:  Intact    Chest/Lungs:  Normal Respiratory Rate    Heart:  Rate: Normal        Anesthesia Plan  Type of Anesthesia, risks & benefits discussed:    Anesthesia Type: Gen Natural Airway, MAC  Intra-op Monitoring Plan: Standard ASA Monitors  Post Op Pain Control Plan: multimodal analgesia and IV/PO Opioids PRN  Induction:  IV  Informed Consent: Informed consent signed with the Patient and all parties understand the risks and agree with anesthesia plan.  All questions answered. Patient consented to blood products? Yes  ASA Score: 2  Day of Surgery Review of History & Physical: I have interviewed and examined the patient. I have reviewed the patient's H&P dated: There are no significant changes.     Ready For Surgery From Anesthesia Perspective.      .

## 2023-06-15 NOTE — DISCHARGE INSTRUCTIONS
Procedure Date  6/15/23     Impression  Overall Impression: Diverticula were present in the sigmoid and descending colon. Four polyps were removed via hot snare, two in the transverse, one in the descending and one in the rectum. A clip was placed at the distal transverse colon polypectomy site.     Recommendation    Await pathology results     Repeat colonoscopy in 3 years      High fiber diet  Discharge:  Disp: DC to home, resume diet, no driving x 24 hours. F/U with PCP as scheduled.  Dx: family history of colon cancer, colon diverticulosis, four polyps were removed on this exam.     Indication  Screening for colon cancer; family history of colon cancer

## 2023-06-15 NOTE — H&P
[facility] - Endoscopy  Gastroenterology  H&P    Patient Name: Praful Esposito III  MRN: [id number]  Admission Date: 6/15/2023  Code Status: Full Code    Attending Provider: Jasbir Amado MD   Primary Care Physician: Chandra De La Rosa DO  Principal Problem:<principal problem not specified>    Subjective:     History of Present Illness: Pt has family hx of colon cancer in his father. His last colonoscopy was years ago and no report is available.    Past Medical History:   Diagnosis Date    Acute bronchitis     Benign prostatic hyperplasia with lower urinary tract symptoms     Bladder neck obstruction     Chronic prostatitis     Dysplasia of prostate     Elevated PSA     Fatigue     Urinary tract infection        Past Surgical History:   Procedure Laterality Date    BACK SURGERY      CARPAL TUNNEL RELEASE Right 10/24/2022    Procedure: RELEASE, CARPAL TUNNEL;  Surgeon: Jose Pickard III, MD;  Location: [facility];  Service: Orthopedics;  Laterality: Right;    CARPAL TUNNEL RELEASE Left 2022    Procedure: RELEASE, CARPAL TUNNEL;  Surgeon: Jose Pickard III, MD;  Location: [facility];  Service: Orthopedics;  Laterality: Left;    TONSILLECTOMY      TRUS Biopsy of prostate         Review of patient's allergies indicates:  No Known Allergies  Family History       Problem Relation (Age of Onset)    Cancer Other, Father    Clotting disorder Other    Dementia Other    Heart disease Other          Tobacco Use    Smoking status: Former     Packs/day: 0.50     Years: 3.00     Pack years: 1.50     Types: Cigarettes     Quit date:      Years since quittin.4    Smokeless tobacco: Former     Types: Chew   Substance and Sexual Activity    Alcohol use: Not Currently     Comment: occassionaly    Drug use: Never    Sexual activity: Yes     Partners: Female     Birth control/protection: None     Review of Systems   Respiratory: Negative.     Cardiovascular: Negative.    Gastrointestinal: Negative.     Objective:     Vital Signs (Most Recent):  Pulse: 67 (06/15/23 0824)  Resp: 12 (06/15/23 0824)  BP: (!) 150/60 (06/15/23 0824)  SpO2: 95 % (06/15/23 0824) Vital Signs (24h Range):  Pulse:  [67] 67  Resp:  [12] 12  SpO2:  [95 %] 95 %  BP: (150)/(60) 150/60     Weight: 81.2 kg (179 lb) (06/15/23 0824)  Body mass index is 26.43 kg/m².    No intake or output data in the 24 hours ending 06/15/23 0844    Lines/Drains/Airways       Peripheral Intravenous Line  Duration                  Peripheral IV - Single Lumen 06/15/23 0823 22 G Posterior;Right Hand <1 day                    Physical Exam  Vitals reviewed.   Constitutional:       General: He is not in acute distress.     Appearance: Normal appearance. He is well-developed. He is not ill-appearing.   HENT:      Head: Normocephalic and atraumatic.      Nose: Nose normal.   Eyes:      Pupils: Pupils are equal, round, and reactive to light.   Cardiovascular:      Rate and Rhythm: Normal rate and regular rhythm.   Pulmonary:      Effort: Pulmonary effort is normal.      Breath sounds: Normal breath sounds. No wheezing.   Abdominal:      General: Abdomen is flat. Bowel sounds are normal. There is no distension.      Palpations: Abdomen is soft.      Tenderness: There is no abdominal tenderness. There is no guarding.   Skin:     General: Skin is warm and dry.      Coloration: Skin is not jaundiced.   Neurological:      Mental Status: He is alert.   Psychiatric:         Attention and Perception: Attention normal.         Mood and Affect: Affect normal.         Speech: Speech normal.         Behavior: Behavior is cooperative.      Comments: Pt was calm while speaking.       Significant Labs:  CBC: No results for input(s): WBC, HGB, HCT, PLT in the last 48 hours.  CMP: No results for input(s): GLU, CALCIUM, ALBUMIN, PROT, NA, K, CO2, CL, BUN, CREATININE, ALKPHOS, ALT, AST, BILITOT in the last 48 hours.    Significant Imaging:  Imaging results within the past 24 hours have been  reviewed.    Assessment/Plan:     There are no hospital problems to display for this patient.        Imp: family history of colon cancer  Plan: colonoscopy    Jasbir Amado MD  Gastroenterology  [facility] - Endoscopy

## 2023-06-15 NOTE — ANESTHESIA POSTPROCEDURE EVALUATION
Anesthesia Post Evaluation    Patient: Praful Esposito III    Procedure(s) Performed: * Colonoscopy *    Final Anesthesia Type: general      Patient location during evaluation: GI PACU  Patient participation: Yes- Able to Participate  Level of consciousness: awake and alert  Post-procedure vital signs: reviewed and stable  Pain management: adequate  Airway patency: patent    PONV status at discharge: No PONV  Anesthetic complications: no      Cardiovascular status: blood pressure returned to baseline and hemodynamically stable  Respiratory status: spontaneous ventilation  Hydration status: euvolemic  Follow-up not needed.  Comments: Pt voices appreciation for care          Vitals Value Taken Time   /66 06/15/23 0933   Temp 98 06/15/23 1517   Pulse 61 06/15/23 0934   Resp 11 06/15/23 0934   SpO2 99 % 06/15/23 0924   Vitals shown include unvalidated device data.      Event Time   Out of Recovery 09:52:18         Pain/Gabriel Score: Gabriel Score: 8 (6/15/2023  9:06 AM)

## 2023-06-16 LAB
ESTROGEN SERPL-MCNC: NORMAL PG/ML
INSULIN SERPL-ACNC: NORMAL U[IU]/ML
LAB AP GROSS DESCRIPTION: NORMAL
LAB AP LABORATORY NOTES: NORMAL
T3RU NFR SERPL: NORMAL %

## 2023-09-11 ENCOUNTER — OFFICE VISIT (OUTPATIENT)
Dept: INTERNAL MEDICINE | Facility: CLINIC | Age: 73
End: 2023-09-11
Payer: MEDICARE

## 2023-09-11 VITALS
DIASTOLIC BLOOD PRESSURE: 62 MMHG | TEMPERATURE: 98 F | HEART RATE: 56 BPM | RESPIRATION RATE: 18 BRPM | WEIGHT: 184 LBS | BODY MASS INDEX: 27.25 KG/M2 | HEIGHT: 69 IN | SYSTOLIC BLOOD PRESSURE: 130 MMHG | OXYGEN SATURATION: 97 %

## 2023-09-11 DIAGNOSIS — G56.00 CARPAL TUNNEL SYNDROME, UNSPECIFIED LATERALITY: ICD-10-CM

## 2023-09-11 DIAGNOSIS — M25.512 CHRONIC PAIN OF BOTH SHOULDERS: ICD-10-CM

## 2023-09-11 DIAGNOSIS — Z79.899 OTHER LONG TERM (CURRENT) DRUG THERAPY: ICD-10-CM

## 2023-09-11 DIAGNOSIS — K57.30 DIVERTICULOSIS OF COLON: ICD-10-CM

## 2023-09-11 DIAGNOSIS — K21.9 GASTROESOPHAGEAL REFLUX DISEASE, UNSPECIFIED WHETHER ESOPHAGITIS PRESENT: ICD-10-CM

## 2023-09-11 DIAGNOSIS — G89.29 CHRONIC PAIN OF BOTH SHOULDERS: ICD-10-CM

## 2023-09-11 DIAGNOSIS — M25.511 CHRONIC PAIN OF BOTH SHOULDERS: ICD-10-CM

## 2023-09-11 DIAGNOSIS — Z80.0 FAMILY HISTORY OF COLON CANCER: ICD-10-CM

## 2023-09-11 DIAGNOSIS — R03.0 ELEVATED BLOOD PRESSURE READING: ICD-10-CM

## 2023-09-11 DIAGNOSIS — Z09 FOLLOW-UP EXAM: Primary | ICD-10-CM

## 2023-09-11 DIAGNOSIS — N40.0 BENIGN PROSTATIC HYPERPLASIA, UNSPECIFIED WHETHER LOWER URINARY TRACT SYMPTOMS PRESENT: ICD-10-CM

## 2023-09-11 PROCEDURE — 99214 OFFICE O/P EST MOD 30 MIN: CPT | Mod: S$PBB,,, | Performed by: INTERNAL MEDICINE

## 2023-09-11 PROCEDURE — 99214 PR OFFICE/OUTPT VISIT, EST, LEVL IV, 30-39 MIN: ICD-10-PCS | Mod: S$PBB,,, | Performed by: INTERNAL MEDICINE

## 2023-09-11 PROCEDURE — 99214 OFFICE O/P EST MOD 30 MIN: CPT | Mod: PBBFAC | Performed by: INTERNAL MEDICINE

## 2023-09-11 RX ORDER — DICLOFENAC SODIUM 30 MG/G
GEL TOPICAL 2 TIMES DAILY
Qty: 100 G | Refills: 3 | Status: SHIPPED | OUTPATIENT
Start: 2023-09-11

## 2023-09-11 RX ORDER — ESOMEPRAZOLE MAGNESIUM 40 MG/1
40 CAPSULE, DELAYED RELEASE ORAL
Qty: 90 CAPSULE | Refills: 3 | Status: SHIPPED | OUTPATIENT
Start: 2023-09-11

## 2023-09-11 NOTE — PROGRESS NOTES
Subjective:       Patient ID: Praful Esposito III is a 73 y.o. male.    Chief Complaint: Follow-up (Patient request lab be done. Cholesterol, A1C is his main concern. )    The patient is a 71-year-old white male the presents today for follow-up.  History of GERD and BPH.  At his last visit he complained of cough and wheezing.  Chest x-ray showed no acute process.  COVID swab was negative.  We prescribed him a Ventolin inhaler and his cough has resolved.  No complaints today.  His systolic blood pressure is  Elevated.  No history of hypertension.  He is currently resting comfortably in no distress.  He is afebrile and other than systolic blood pressure vital signs are stable.  He states systolic blood pressure normally runs in the 130s to 140s.      3/2/23- Patient presents today for follow up. He denies any complaints at this visit. He reports having bilateral carpel tunnel release since his last visit with improvement in symptoms. BP today is 147/65. He reports that he does routinely monitor his BP at home and his systolic BP ranges from 130-140s. He denies any issues with his reflux but does request a refill on his Nexium. Since the last visit, he was unable to get scheduled for his colonoscopy. Patient appears in no distress at the time of the exam. BP elevated and other vital signs are stable.    9/11/23-the patient presents today for follow-up.  No significant changes in his health since we last saw him.  He states that he does still have some swelling in his hands at times.  The pain improved after his carpal tunnel procedure.  Blood pressure looks great today it is 130/62.  He had his colonoscopy since we last saw him.  He would a few polyps which were removed.  They were benign.  Today he is resting comfortably in no distress.  He is afebrile and vital signs are stable.    Follow-up  Associated symptoms include arthralgias. Pertinent negatives include no abdominal pain, chest pain, chills, coughing, fatigue,  fever, headaches, joint swelling, myalgias, nausea, neck pain, rash, sore throat, vomiting or weakness.     Review of Systems   Constitutional:  Negative for activity change, appetite change, chills, fatigue, fever and unexpected weight change.   HENT:  Negative for ear pain, hearing loss, rhinorrhea, sinus pressure/congestion, sore throat and trouble swallowing.    Eyes:  Negative for pain, discharge, redness and visual disturbance.   Respiratory:  Negative for apnea, cough, chest tightness, shortness of breath and wheezing.    Cardiovascular:  Negative for chest pain, palpitations and leg swelling.   Gastrointestinal:  Negative for abdominal pain, blood in stool, constipation, diarrhea, nausea and vomiting.   Endocrine: Negative for cold intolerance, heat intolerance, polydipsia and polyuria.   Genitourinary:  Negative for difficulty urinating, dysuria, hematuria and urgency.   Musculoskeletal:  Positive for arthralgias and back pain. Negative for joint swelling, myalgias and neck pain.   Integumentary:  Negative for pallor and rash.   Allergic/Immunologic: Negative for frequent infections.   Neurological:  Negative for tremors, seizures, weakness and headaches.   Hematological:  Negative for adenopathy.   Psychiatric/Behavioral:  Negative for confusion, dysphoric mood and sleep disturbance. The patient is not nervous/anxious.          Objective:      Physical Exam  Vitals and nursing note reviewed.   Constitutional:       General: He is not in acute distress.     Appearance: Normal appearance. He is not ill-appearing.   HENT:      Head: Normocephalic and atraumatic.      Right Ear: External ear normal.      Left Ear: External ear normal.      Nose: Nose normal.      Mouth/Throat:      Pharynx: Oropharynx is clear.   Eyes:      Extraocular Movements: Extraocular movements intact.      Conjunctiva/sclera: Conjunctivae normal.      Pupils: Pupils are equal, round, and reactive to light.   Neck:      Vascular: No  carotid bruit.   Cardiovascular:      Rate and Rhythm: Normal rate and regular rhythm.      Pulses: Normal pulses.      Heart sounds: Normal heart sounds. No murmur heard.  Pulmonary:      Effort: No respiratory distress.      Breath sounds: No wheezing or rales.   Abdominal:      General: Bowel sounds are normal. There is no distension.      Palpations: Abdomen is soft.   Musculoskeletal:         General: Normal range of motion.      Cervical back: Normal range of motion and neck supple.      Right lower leg: No edema.      Left lower leg: No edema.   Skin:     General: Skin is warm and dry.      Capillary Refill: Capillary refill takes less than 2 seconds.      Coloration: Skin is not pale.   Neurological:      General: No focal deficit present.      Mental Status: He is alert and oriented to person, place, and time.      Cranial Nerves: No cranial nerve deficit.      Sensory: No sensory deficit.      Motor: No weakness.   Psychiatric:         Mood and Affect: Mood normal.         Judgment: Judgment normal.         Assessment:       1. Follow-up exam    2. Carpal tunnel syndrome, unspecified laterality    3. Elevated blood pressure reading    4. Benign prostatic hyperplasia, unspecified whether lower urinary tract symptoms present    5. Family history of colon cancer    6. Gastroesophageal reflux disease, unspecified whether esophagitis present    7. Diverticulosis of colon    8. Chronic pain of both shoulders    9. Other long term (current) drug therapy        Plan:       1. The patient presents today for follow-up.  Lab work has been done within the last year.  He had his colonoscopy done in June 2023.  He had some diverticula and 4 polyps which were removed. .  Since we last saw him he had bilateral carpal tunnel procedures done in his hands are doing better.  He is also undergone physical therapy for some partial tears in his shoulder.  This has made it manageable    2. Hearing loss-we will make a referral to  audiology for a formal hearing test  9/11/23-no acute issues.  According to his wife he did have the testing done    3. Elevated blood pressure reading-no diagnosis hypertension.  Systolic blood pressure 160 today.  He states that it normally runs in the 130s to 140s.  His last blood pressure log shows systolics in the 130s to 140s.  I think he likely has a component of white coat hypertension.  He left his most recent blood pressure log at home but will get that back to us.  3/2/23-he certainly has a component of white coat hypertension.  Systolics in the 130s away from here.  Today it is 147/65.  We will continue to monitor  9/11/23-blood pressure looks good today.  It is 130/62    4. BPH-stable.  PSA 8.96.  This is stable He follows with Urology--biopsy and MRI both negative    5. GERD-stable on Nexium    6. Rash-comes and goes.  On the face.  Rosacea?  He has an appointment scheduled with Dermatology in October.  3/2/23-he had follow-up with Dermatology in his issues have resolved    7. Erectile dysfunction-testosterone is in normal range but towards the bottom of normal.  Tadalafil p.r.n..  He has inquired about hormone replacement therapy.  I will do some research and get back with a  9/11/23-he started a DHEA supplement over-the-counter and this has improved his symptoms.  Given his normal testosterone level no indications for testosterone replacement    Return to care in 6 months or sooner if needed.  We will repeat labs at next visit

## 2023-10-09 DIAGNOSIS — Z71.89 COMPLEX CARE COORDINATION: ICD-10-CM

## 2023-12-13 ENCOUNTER — OFFICE VISIT (OUTPATIENT)
Dept: UROLOGY | Facility: CLINIC | Age: 73
End: 2023-12-13
Payer: MEDICARE

## 2023-12-13 VITALS
BODY MASS INDEX: 27.17 KG/M2 | HEIGHT: 69 IN | HEART RATE: 61 BPM | DIASTOLIC BLOOD PRESSURE: 82 MMHG | SYSTOLIC BLOOD PRESSURE: 154 MMHG

## 2023-12-13 DIAGNOSIS — N32.0 BLADDER OUTLET OBSTRUCTION: ICD-10-CM

## 2023-12-13 DIAGNOSIS — N42.30 DYSPLASIA OF PROSTATE: ICD-10-CM

## 2023-12-13 DIAGNOSIS — R97.20 ELEVATED PSA: Primary | ICD-10-CM

## 2023-12-13 DIAGNOSIS — Z80.42 FAMILY HISTORY OF PROSTATE CANCER: ICD-10-CM

## 2023-12-13 DIAGNOSIS — N41.1 CHRONIC PROSTATITIS: ICD-10-CM

## 2023-12-13 PROCEDURE — 99213 OFFICE O/P EST LOW 20 MIN: CPT | Mod: S$PBB,,, | Performed by: UROLOGY

## 2023-12-13 PROCEDURE — 99213 PR OFFICE/OUTPT VISIT, EST, LEVL III, 20-29 MIN: ICD-10-PCS | Mod: S$PBB,,, | Performed by: UROLOGY

## 2023-12-13 PROCEDURE — 99214 OFFICE O/P EST MOD 30 MIN: CPT | Mod: PBBFAC | Performed by: UROLOGY

## 2023-12-13 NOTE — PROGRESS NOTES
Subjective     Patient ID: Praful Esposito III is a 73 y.o. male.    Chief Complaint: Follow-up (Six month PSA and visit. )       History of Present Illness  Mr. Esposito is now 64 years old. He is seen for the first time as a clinic patient in nearly 5 years. He has been seen at yearly screens and has had PSAs done virtually every year. He has a relatively high normal PSA. He has had some increase in lower tract obstructive symptoms. He does not void as well compared to what he used to. He has not on any regular medications and does not feel he needs anything yet to help with the urine flow. No new health issues that he is aware of but he has not had any blood work in several years and desires general check up also. Does not have primary physician currently but has used Dr. Gonzalez in the past.  (April 1, 2015)     Mr. Esposito's PSA has continued to go up. It was 4.71 at recent prostate screening in September. We had recommended that he have prostate biopsies and he comes in today to have these done. Clinically unchanged. He does have mild-to-moderate lower tract obstructive symptoms.  (November 19 2015)     Mr. Esposito is in for follow-up of PSA elevation. 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 Prostate size was 50 mL. He has not been having a good stream and has trouble emptying and has slowing of his stream. He desires no medication to help with that at this time. He has had a good 6 months overall. (July 26, 2016)     Mr. Esposito is in for his 6 month follow-up for PSA elevation. Biopsies slightly over a year ago did not show any suggestion of cancer but did have high-grade PIN and continued follow-up indicated. He is doing okay clinically with no worsening bladder outlet obstructive symptoms. PSA is down slightly to 4.460.  (January 31, 2017)     Mr. Esposito is in for 6 month follow-up of chronic PSA elevation. Clinically doing okay with no worsening bladder  outlet obstructive symptoms or other difficulty. (August 1, 2017)     Mr. Esposito had his MRI of the prostate done on September 12, 2017. The size of the gland was 61 mL. The patient had  no suspicious lesions identified and no biopsy was felt to be indicated. Returned today for follow-up PSA and recheck. He's had no significant increase in voiding symptoms with relatively mild bladder outlet obstructive problems. No new health issues. Recently retired from [employer]. (February 6, 2018)     Mr. Esposito is in for his 6 month follow-up for PSA elevation. Clinically doing okay without any worsening bladder outlet obstructive symptoms. Typically nocturia x1. He takes no medications. Overall satisfactory 6 months. (August 7, 2018)     Mr. Esposito is in for first time in a year. He missed his last appointment because he was getting over back surgery that he had in December. He had that done in [facility]. He is doing fairly well with voiding with some slight hesitancy and usually nocturia x1. He does not want any pharmacologic help with the way he is voiding. (October 31, 2019)     Mr. Esposito is in for first visit in a year. He has had chronic PSA elevation for several years. He's had previous biopsies and had previous MRI of the prostate done at [place]. Clinically doing okay with nocturia one to 2 times nightly. He's had no new health problems this year feels he is stable clinically. (November 9, 2020)     PSA was 7.510 on 12/14/2020  PSA was 7.810 on November 9, 2020  PSA was 5.740 on October 31, 2019   PSA was 6.280 on  August 7, 2018  PSA was 5.050 on February 6, 2018  PSA was 6.650 on August 1, 2017  PSA was 4.460 on 1/31/2017  PSA was 4.970 on July 26, 2016  PSA was 4.71 on September 22, 2015  PSA was 3.630 on April 1, 2015, Past PSA Results   PSA was 2.89 on September 13, 2011  2.03 on September 22, 2009  2.54 on September 23, 2008  1.88 on September 18, 2007  2.59 on September 19, 2006  1.92 on September 20,  2005  1.3 on September 16, 2003  1.5 on September 27, 2000  -------------------------------------------------------------------------------------------------------------------------------------------------------------------------------------------------------------------  -The above notes are from the old electronic medical record--------------------------------------------------------------------     PSA was 9.130 on November 16, 2021  PSA was 8.640 on June 13, 2022  PSA was 8.630 on December 13, 2022  PSA was 8.960 on June 13, 2023  PSA was 7.360 on December 13, 2023     Mr. Esposito is in for 1st visit in a year.  He has chronic PSA elevation.  PSA pending while he was in the office.  He feels he is stable with no change with nocturia only 1 time nightly.  He has been followed for PSA elevation for over 10 years.  No worsening bladder outlet obstructive symptoms.  Typically nocturia only 1 time nightly like he has had for the last few years.  He had a MRI of prostate at [facility] in September 2017. Had biopsies done in 2015. General health has been good and he has had no other health issues. (November 15, 2021)          Mr. Esposito  was in for his MRI of the prostate and follow-up of his PSA elevation that had actually gone to the highest level it has ever been on his PSA done last month.  No worsening bladder outlet obstructive symptoms. (December 9, 2021)     Mr. Esposito is in for his 6 month follow-up of chronic PSA elevation.  He feels he is stable with voiding with nocturia x1 on no medications.  MRI was a PI-RADS 2 with 57 mL prostate.  He does not feel he needs any help with voiding at present and has had a good 6 months.   PSA drawn and pending.  (June 13, 2022)     Mr. Esposito is in for his 6 month follow-up for PSA elevation.  He also wanted to get his testosterone and estradiol checked.  Energy level has not been as good as desirable.  He has had a lot of trouble with the shoulders recently and also had a  carpal tunnel release recently.  Nocturia 2 times nightly with no worsening bladder outlet obstructive symptoms.  2 lb weight loss from last visit.  He is having some major discomfort from his shoulders and may be forced to do something about that in the near future. [December 13, 2022]     Mr. Esposito is in for six-month follow-up of chronic PSA elevation.  He is doing okay clinically with no worsening bladder outlet obstructive symptoms.  Typically nocturia x1.  He had his PSA drawn this morning and results pending.  No new problems otherwise. [  June 13, 2023]     Mr. Espsoito is in for his six-month follow-up of PSA elevation.  He has had a good 6 months with no worsening bladder outlet obstructive symptoms or other significant health issues.  Still has nocturia x1 typically.  PSA is drawn and pending.  [December 13, 2023]    Follow-up      Review of Systems       Objective     Physical Exam  Constitutional:       Appearance: Normal appearance. He is normal weight.   Genitourinary:     Prostate: Normal.      Rectum: Normal.      Comments: Prostate is estimated 40 g smooth firm and symmetrical  Neurological:      Mental Status: He is alert.   Psychiatric:         Mood and Affect: Mood normal.         Behavior: Behavior normal.     Urinalysis revealed only occasional pus cells.  The dipstick had 1+ leukocytes with pH of 6.0 and specific gravity 1.020     Assessment and Plan     1. Elevated PSA  -     PSA, Total (Diagnostic); Future; Expected date: 06/13/2024    2. Dysplasia of prostate    3. Chronic prostatitis    4. Bladder outlet obstruction    5. Family history of prostate cancer      Mr. Esposito does not desire any help with the way he is voiding at present.  I called and notified him of his PSA after he left the office.  It is down slightly to 7.360.  Six-month appointment with PSA.          Follow up in 6 months (on 6/13/2024).

## 2023-12-13 NOTE — PATIENT INSTRUCTIONS
Mr. Esposito does not desire any help with the way he is voiding at present.  I called and notified him of his PSA after he left the office.  It is down slightly to 7.360.  Six-month appointment with PSA.      Thursday, June 13, 2024 at 3:00 PM for 6 month PSA and office visit with Dr. Denny Jr.

## 2024-02-05 ENCOUNTER — TELEPHONE (OUTPATIENT)
Dept: INTERNAL MEDICINE | Facility: CLINIC | Age: 74
End: 2024-02-05
Payer: MEDICARE

## 2024-02-05 NOTE — TELEPHONE ENCOUNTER
----- Message from Richy Pickard sent at 2/5/2024  3:58 PM CST -----  Patient called stating that he had a missed call from a nurse but his phone wouldn't ring, and he would like for the nurse to give him a call back at [phone]

## 2024-02-05 NOTE — TELEPHONE ENCOUNTER
----- Message from Alida Forbes sent at 2/5/2024  2:28 PM CST -----  Patient called stating he feel weak. Please give him a call.

## 2024-03-01 ENCOUNTER — TELEPHONE (OUTPATIENT)
Dept: INTERNAL MEDICINE | Facility: CLINIC | Age: 74
End: 2024-03-01
Payer: MEDICARE

## 2024-03-07 ENCOUNTER — OFFICE VISIT (OUTPATIENT)
Dept: INTERNAL MEDICINE | Facility: CLINIC | Age: 74
End: 2024-03-07
Payer: MEDICARE

## 2024-03-07 VITALS
HEART RATE: 55 BPM | WEIGHT: 188.63 LBS | RESPIRATION RATE: 18 BRPM | BODY MASS INDEX: 27.94 KG/M2 | OXYGEN SATURATION: 96 % | TEMPERATURE: 97 F | DIASTOLIC BLOOD PRESSURE: 78 MMHG | SYSTOLIC BLOOD PRESSURE: 132 MMHG | HEIGHT: 69 IN

## 2024-03-07 DIAGNOSIS — Z09 FOLLOW-UP EXAM: Primary | ICD-10-CM

## 2024-03-07 DIAGNOSIS — Z13.6 ENCOUNTER FOR SCREENING FOR CARDIOVASCULAR DISORDERS: ICD-10-CM

## 2024-03-07 DIAGNOSIS — K21.9 GASTROESOPHAGEAL REFLUX DISEASE, UNSPECIFIED WHETHER ESOPHAGITIS PRESENT: ICD-10-CM

## 2024-03-07 DIAGNOSIS — N40.0 BENIGN PROSTATIC HYPERPLASIA, UNSPECIFIED WHETHER LOWER URINARY TRACT SYMPTOMS PRESENT: ICD-10-CM

## 2024-03-07 DIAGNOSIS — G56.00 CARPAL TUNNEL SYNDROME, UNSPECIFIED LATERALITY: ICD-10-CM

## 2024-03-07 DIAGNOSIS — R55 NEAR SYNCOPE: ICD-10-CM

## 2024-03-07 DIAGNOSIS — H91.90 HEARING LOSS, UNSPECIFIED HEARING LOSS TYPE, UNSPECIFIED LATERALITY: ICD-10-CM

## 2024-03-07 DIAGNOSIS — R03.0 ELEVATED BLOOD PRESSURE READING: ICD-10-CM

## 2024-03-07 PROCEDURE — 99215 OFFICE O/P EST HI 40 MIN: CPT | Mod: PBBFAC | Performed by: INTERNAL MEDICINE

## 2024-03-07 PROCEDURE — 99214 OFFICE O/P EST MOD 30 MIN: CPT | Mod: S$PBB,,, | Performed by: INTERNAL MEDICINE

## 2024-03-07 NOTE — PROGRESS NOTES
Subjective:       Patient ID: Praful Esposito III is a 73 y.o. male.    Chief Complaint: Follow-up (States he would like to lose weight. )    The patient is a 71-year-old white male the presents today for follow-up.  History of GERD and BPH.  At his last visit he complained of cough and wheezing.  Chest x-ray showed no acute process.  COVID swab was negative.  We prescribed him a Ventolin inhaler and his cough has resolved.  No complaints today.  His systolic blood pressure is  Elevated.  No history of hypertension.  He is currently resting comfortably in no distress.  He is afebrile and other than systolic blood pressure vital signs are stable.  He states systolic blood pressure normally runs in the 130s to 140s.      3/7/24-Mr. Esposito is a 73-year-old male the presents today for follow-up.  He is up-to-date on age-appropriate health screenings.  He plans to get shingles vaccine today at his pharmacy.  Blood pressure is up a little bit today at 144/82.  He states that he has been trying to lose some weight.  We have discussed options as far as blood pressure is concerned and he would like to try the dash diet 1st.  He has also had a couple of episodes of near-syncope.  One was after working outside in the heat moving rocks.  The other was recently while getting ready to perform an exercise working out.  This 2nd 1 he did not lose consciousness.  However he did with the 1st 1.  No palpitations, no chest pain, no shortness a breath.  Still having some swelling in bilateral hands at times.  Otherwise denies any complaints.  He is resting comfortably today in no distress.  He is afebrile and vital signs are stable.    Follow-up  Associated symptoms include arthralgias. Pertinent negatives include no abdominal pain, chest pain, chills, coughing, fatigue, fever, headaches, joint swelling, myalgias, nausea, neck pain, rash, sore throat, vomiting or weakness.     Review of Systems   Constitutional:  Negative for activity  change, appetite change, chills, fatigue, fever and unexpected weight change.   HENT:  Negative for ear pain, hearing loss, rhinorrhea, sinus pressure/congestion, sore throat and trouble swallowing.    Eyes:  Negative for pain, discharge, redness and visual disturbance.   Respiratory:  Negative for apnea, cough, chest tightness, shortness of breath and wheezing.    Cardiovascular:  Negative for chest pain, palpitations and leg swelling.   Gastrointestinal:  Negative for abdominal pain, blood in stool, constipation, diarrhea, nausea and vomiting.   Endocrine: Negative for cold intolerance, heat intolerance, polydipsia and polyuria.   Genitourinary:  Negative for difficulty urinating, dysuria, hematuria and urgency.   Musculoskeletal:  Positive for arthralgias. Negative for back pain, joint swelling, myalgias and neck pain.   Integumentary:  Negative for pallor and rash.   Allergic/Immunologic: Negative for frequent infections.   Neurological:  Positive for dizziness. Negative for tremors, seizures, weakness and headaches.   Hematological:  Negative for adenopathy.   Psychiatric/Behavioral:  Negative for confusion, dysphoric mood and sleep disturbance. The patient is not nervous/anxious.          Objective:      Physical Exam  Vitals and nursing note reviewed.   Constitutional:       General: He is not in acute distress.     Appearance: Normal appearance. He is not ill-appearing.   HENT:      Head: Normocephalic and atraumatic.      Right Ear: External ear normal.      Left Ear: External ear normal.      Nose: Nose normal.      Mouth/Throat:      Pharynx: Oropharynx is clear.   Eyes:      Extraocular Movements: Extraocular movements intact.      Conjunctiva/sclera: Conjunctivae normal.      Pupils: Pupils are equal, round, and reactive to light.   Neck:      Vascular: No carotid bruit.   Cardiovascular:      Rate and Rhythm: Regular rhythm. Bradycardia present.      Pulses: Normal pulses.      Heart sounds: Normal  heart sounds. No murmur heard.  Pulmonary:      Effort: No respiratory distress.      Breath sounds: No wheezing or rales.   Abdominal:      General: Bowel sounds are normal. There is no distension.      Palpations: Abdomen is soft.   Musculoskeletal:         General: Normal range of motion.      Cervical back: Normal range of motion and neck supple.      Right lower leg: No edema.      Left lower leg: No edema.   Skin:     General: Skin is warm and dry.      Capillary Refill: Capillary refill takes less than 2 seconds.      Coloration: Skin is not pale.   Neurological:      General: No focal deficit present.      Mental Status: He is alert and oriented to person, place, and time.      Cranial Nerves: No cranial nerve deficit.      Sensory: No sensory deficit.      Motor: No weakness.   Psychiatric:         Mood and Affect: Mood normal.         Judgment: Judgment normal.         Assessment:       1. Follow-up exam    2. Carpal tunnel syndrome, unspecified laterality    3. Hearing loss, unspecified hearing loss type, unspecified laterality    4. Elevated blood pressure reading    5. Benign prostatic hyperplasia, unspecified whether lower urinary tract symptoms present    6. Gastroesophageal reflux disease, unspecified whether esophagitis present    7. Encounter for screening for cardiovascular disorders    8. Near syncope        Plan:       1. The patient presents today for follow-up.    He had his colonoscopy done in June 2023.  He had some diverticula and 4 polyps which were removed. .  We are going to check some lab work today.  We have discussed age-appropriate vaccines.  He plans to do this through his pharmacy.  He is getting shingles vaccine today.  We have also discussed the Pneumovax.  Only other thing we need to set him up for is a AAA screening.  Will discuss this with the patient    2. Hearing loss-we will make a referral to audiology for a formal hearing test  3/7/24-he did have hearing testing done.   Currently no acute issues    3. Elevated blood pressure reading-no diagnosis hypertension.  Blood pressure today 144/82.  He states that it has been creeping up a little bit.  He is trying to lose some weight.  We did check orthostatics on him.  Seated blood pressure was 140/88 and standing blood pressure 132/78.  Orthostasis could be contributing a little to these near syncopal episodes.  We are going to give him a copy of the dash diet.  Have him return to care in 3 months and see how he is doing    4. BPH-stable.  PSA 8.96.  This is stable He follows with Urology--biopsy and MRI both negative    5. GERD-stable on Nexium    6. Rash-comes and goes.  On the face.  Rosacea?  He has an appointment scheduled with Dermatology in October.  3/2/23-he had follow-up with Dermatology in his issues have resolved    7. Erectile dysfunction-testosterone is in normal range but towards the bottom of normal.  Tadalafil p.r.n..  He has inquired about hormone replacement therapy.  I will do some research and get back with a  9/11/23-he started a DHEA supplement over-the-counter and this has improved his symptoms.  Given his normal testosterone level no indications for testosterone replacement    8. Near-syncope-2 episodes.  Sounds more like vasovagal syncope or orthostasis.  We did check orthostatic vitals and he does have a 10 mm of mercury drop diastolic.  This certainly could be contributing.  We have discussed changing positions slowly and carefully.  He is mildly bradycardic today as well.  If he has another issue we are going to order a stress test for him.    Billing for this encounter is based on 38 minutes of time spent also moderate level of medical decision-making    Return to care in 3 months or sooner if needed.  We will repeat labs at next visit

## 2024-05-09 DIAGNOSIS — Z71.89 COMPLEX CARE COORDINATION: ICD-10-CM

## 2024-07-15 NOTE — PROGRESS NOTES
Subjective     Patient ID: Praful Esposito III is a 74 y.o. male.    Chief Complaint: No chief complaint on file.    History of Present Illness  Mr. Esposito is now 64 years old. He is seen for the first time as a clinic patient in nearly 5 years. He has been seen at yearly screens and has had PSAs done virtually every year. He has a relatively high normal PSA. He has had some increase in lower tract obstructive symptoms. He does not void as well compared to what he used to. He has not on any regular medications and does not feel he needs anything yet to help with the urine flow. No new health issues that he is aware of but he has not had any blood work in several years and desires general check up also. Does not have primary physician currently but has used Dr. Gonzalez in the past.  (April 1, 2015)     Mr. Esposito's PSA has continued to go up. It was 4.71 at recent prostate screening in September. We had recommended that he have prostate biopsies and he comes in today to have these done. Clinically unchanged. He does have mild-to-moderate lower tract obstructive symptoms.  (November 19 2015)     Mr. Esposito is in for follow-up of PSA elevation. 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 Prostate size was 50 mL. He has not been having a good stream and has trouble emptying and has slowing of his stream. He desires no medication to help with that at this time. He has had a good 6 months overall. (July 26, 2016)     Mr. Esposito is in for his 6 month follow-up for PSA elevation. Biopsies slightly over a year ago did not show any suggestion of cancer but did have high-grade PIN and continued follow-up indicated. He is doing okay clinically with no worsening bladder outlet obstructive symptoms. PSA is down slightly to 4.460.  (January 31, 2017)     Mr. Esposito is in for 6 month follow-up of chronic PSA elevation. Clinically doing okay with no worsening bladder outlet  obstructive symptoms or other difficulty. (August 1, 2017)     Mr. Esposiot had his MRI of the prostate done on September 12, 2017. The size of the gland was 61 mL. The patient had  no suspicious lesions identified and no biopsy was felt to be indicated. Returned today for follow-up PSA and recheck. He's had no significant increase in voiding symptoms with relatively mild bladder outlet obstructive problems. No new health issues. Recently retired from [employer]. (February 6, 2018)     Mr. Esposito is in for his 6 month follow-up for PSA elevation. Clinically doing okay without any worsening bladder outlet obstructive symptoms. Typically nocturia x1. He takes no medications. Overall satisfactory 6 months. (August 7, 2018)     Mr. Esposito is in for first time in a year. He missed his last appointment because he was getting over back surgery that he had in December. He had that done in [facility]. He is doing fairly well with voiding with some slight hesitancy and usually nocturia x1. He does not want any pharmacologic help with the way he is voiding. (October 31, 2019)     Mr. Esposito is in for first visit in a year. He has had chronic PSA elevation for several years. He's had previous biopsies and had previous MRI of the prostate done at [place]. Clinically doing okay with nocturia one to 2 times nightly. He's had no new health problems this year feels he is stable clinically. (November 9, 2020)     PSA was 7.510 on 12/14/2020  PSA was 7.810 on November 9, 2020  PSA was 5.740 on October 31, 2019   PSA was 6.280 on  August 7, 2018  PSA was 5.050 on February 6, 2018  PSA was 6.650 on August 1, 2017  PSA was 4.460 on 1/31/2017  PSA was 4.970 on July 26, 2016  PSA was 4.71 on September 22, 2015  PSA was 3.630 on April 1, 2015, Past PSA Results   PSA was 2.89 on September 13, 2011  2.03 on September 22, 2009  2.54 on September 23, 2008  1.88 on September 18, 2007  2.59 on September 19, 2006  1.92 on September 20, 2005  1.3  on September 16, 2003  1.5 on September 27, 2000  -------------------------------------------------------------------------------------------------------------------------------------------------------------------------------------------------------------------  -The above notes are from the old electronic medical record--------------------------------------------------------------------     PSA was 9.130 on November 16, 2021  PSA was 8.640 on June 13, 2022  PSA was 8.630 on December 13, 2022  PSA was 8.960 on June 13, 2023  PSA was 7.360 on December 13, 2023     Mr. Esposito is in for 1st visit in a year.  He has chronic PSA elevation.  PSA pending while he was in the office.  He feels he is stable with no change with nocturia only 1 time nightly.  He has been followed for PSA elevation for over 10 years.  No worsening bladder outlet obstructive symptoms.  Typically nocturia only 1 time nightly like he has had for the last few years.  He had a MRI of prostate at [place] in September 2017. Had biopsies done in 2015. General health has been good and he has had no other health issues. (November 15, 2021)          Mr. Esposito  was in for his MRI of the prostate and follow-up of his PSA elevation that had actually gone to the highest level it has ever been on his PSA done last month.  No worsening bladder outlet obstructive symptoms. (December 9, 2021)     Mr. Esposito is in for his 6 month follow-up of chronic PSA elevation.  He feels he is stable with voiding with nocturia x1 on no medications.  MRI was a PI-RADS 2 with 57 mL prostate.  He does not feel he needs any help with voiding at present and has had a good 6 months.   PSA drawn and pending.  (June 13, 2022)     Mr. Esposito is in for his 6 month follow-up for PSA elevation.  He also wanted to get his testosterone and estradiol checked.  Energy level has not been as good as desirable.  He has had a lot of trouble with the shoulders recently and also had a carpal tunnel  release recently.  Nocturia 2 times nightly with no worsening bladder outlet obstructive symptoms.  2 lb weight loss from last visit.  He is having some major discomfort from his shoulders and may be forced to do something about that in the near future. [December 13, 2022]     Mr. Esposito is in for six-month follow-up of chronic PSA elevation.  He is doing okay clinically with no worsening bladder outlet obstructive symptoms.  Typically nocturia x1.  He had his PSA drawn this morning and results pending.  No new problems otherwise. [  June 13, 2023]     Mr. Esposito is in for his six-month follow-up of PSA elevation.  He has had a good 6 months with no worsening bladder outlet obstructive symptoms or other significant health issues.  Still has nocturia x1 typically.  PSA is drawn and pending.  [December 13, 2023]  ----------------------------------------------------------------------------------------------------------------------------------------------------------------------------------------------------------------------------------------------------------------------------------------------------------------------    The above notes are from Dr. MYLES Baldwin in the EMR system.       This pleasant 74 year old male presents to the clinic for follow up of chronic PSA elevation, chronic prostatitis, PEDROZA, and dysplasia of the prostate.  Patient states he is doing good.  He is a long standing patient of Dr. MYLES Baldwin and was last seen in the clinic in December 2023.  His PSA was not resulted at the time of the visit and we will call him these results and recommendations.  He is  pleased  with the way he voids.  He desires no intervention at this time with his voiding. His IPSS score is 4 that reflects intermittency, weak stream and nocturia 1 time a night.  He denies dysuria, hematuria, incomplete bladder emptying, frequency, urgency, or straining to urinate..   He denies fever, chills, nausea or vomiting.  He denies abdominal,  bladder or back pain. Records indicates he had a MRI of prostate at [place] in September 2017 and biopsies done in 2015. His MRI of the Prostate done on December 9, 2021 showed a PI-RADS version 2.1 score: 2 and Findings of BPH. I plan to see the patient back in the clinic in 6 months with another PSA.   I discussed the plan in detail with the patient and he is in agreement with the plan.  All his questions were answered at today's visit. I spent 20 minutes counseling this patient     PSA History:   PSA was 9.130 on November 16, 2021  PSA was 8.640 on June 13, 2022  PSA was 8.630 on December 13, 2022  PSA was 8.960 on June 13, 2023  PSA was 7.360 on December 13, 2023  ------------------------------------------------------------------------------------------------------------------  [July 17, 2024].       Review of Systems   Constitutional:  Negative for activity change and fever.   HENT:  Negative for hearing loss and trouble swallowing.    Eyes:  Negative for visual disturbance.   Respiratory:  Negative for cough, shortness of breath and wheezing.    Cardiovascular:  Negative for chest pain.   Gastrointestinal:  Negative for abdominal pain, diarrhea, nausea and vomiting.   Endocrine: Negative for polyuria.   Genitourinary:  Negative for bladder incontinence, decreased urine volume, difficulty urinating, discharge, dysuria, enuresis, erectile dysfunction, flank pain, frequency, genital sores, hematuria, penile pain, penile swelling, scrotal swelling, testicular pain and urgency.        Chronic elevated PSA        Chronic Prostatitis        PEDROZA        Dysplasia of prostate    Musculoskeletal:  Negative for back pain and gait problem.   Integumentary:  Negative for rash.   Neurological:  Negative for speech difficulty and weakness.   Psychiatric/Behavioral:  Negative for behavioral problems and confusion.           Objective     Physical Exam  Vitals and nursing note reviewed.   Constitutional:       General: He is  not in acute distress.     Appearance: Normal appearance. He is not ill-appearing, toxic-appearing or diaphoretic.   HENT:      Head: Normocephalic.   Eyes:      Extraocular Movements: Extraocular movements intact.   Cardiovascular:      Rate and Rhythm: Normal rate and regular rhythm.      Heart sounds: Normal heart sounds.   Pulmonary:      Effort: Pulmonary effort is normal. No respiratory distress.      Breath sounds: Normal breath sounds. No wheezing, rhonchi or rales.   Abdominal:      General: Bowel sounds are normal.      Palpations: Abdomen is soft.      Tenderness: There is no abdominal tenderness. There is no right CVA tenderness, left CVA tenderness, guarding or rebound.   Genitourinary:     Comments: EMILIANO at next visit   Musculoskeletal:         General: Normal range of motion.      Cervical back: Normal range of motion. No rigidity.   Skin:     General: Skin is warm and dry.   Neurological:      General: No focal deficit present.      Mental Status: He is alert and oriented to person, place, and time.      Motor: No weakness.      Coordination: Coordination normal.      Gait: Gait normal.   Psychiatric:         Mood and Affect: Mood normal.         Behavior: Behavior normal.         Thought Content: Thought content normal.          Assessment and Plan     Problem List Items Addressed This Visit          Renal/    Elevated PSA - Primary    Relevant Orders    PSA, Total (Diagnostic)    Dysplasia of prostate    Chronic prostatitis    Bladder neck obstruction       Oncology    Family history of colon cancer            Call PSA results from today   Repeat PSA in 6 months   EMILIANO at next visit   Desires not to start any medication to help him void   Continue Cialis 10 mg as prescribed   Follow up with Urology NP KENNY Leiva in 6 months

## 2024-07-17 ENCOUNTER — OFFICE VISIT (OUTPATIENT)
Dept: UROLOGY | Facility: CLINIC | Age: 74
End: 2024-07-17
Payer: MEDICARE

## 2024-07-17 VITALS
HEIGHT: 69 IN | DIASTOLIC BLOOD PRESSURE: 68 MMHG | HEART RATE: 51 BPM | OXYGEN SATURATION: 96 % | RESPIRATION RATE: 18 BRPM | WEIGHT: 188 LBS | SYSTOLIC BLOOD PRESSURE: 150 MMHG | BODY MASS INDEX: 27.85 KG/M2 | TEMPERATURE: 98 F

## 2024-07-17 DIAGNOSIS — N32.0 BLADDER NECK OBSTRUCTION: ICD-10-CM

## 2024-07-17 DIAGNOSIS — Z80.0 FAMILY HISTORY OF COLON CANCER: ICD-10-CM

## 2024-07-17 DIAGNOSIS — N41.1 CHRONIC PROSTATITIS: ICD-10-CM

## 2024-07-17 DIAGNOSIS — R97.20 ELEVATED PSA: Primary | ICD-10-CM

## 2024-07-17 DIAGNOSIS — N42.30 DYSPLASIA OF PROSTATE: ICD-10-CM

## 2024-07-17 PROBLEM — G47.9 DISTURBANCE IN SLEEP BEHAVIOR: Status: ACTIVE | Noted: 2024-07-17

## 2024-07-17 PROBLEM — J30.2 SEASONAL ALLERGIES: Status: ACTIVE | Noted: 2024-07-17

## 2024-07-17 PROBLEM — G47.33 OSA (OBSTRUCTIVE SLEEP APNEA): Status: ACTIVE | Noted: 2024-07-17

## 2024-07-17 PROBLEM — Z86.19 HISTORY OF INFECTIOUS DISEASE: Status: ACTIVE | Noted: 2024-07-17

## 2024-07-17 PROCEDURE — 99214 OFFICE O/P EST MOD 30 MIN: CPT | Mod: PBBFAC | Performed by: NURSE PRACTITIONER

## 2024-07-17 PROCEDURE — 99999 PR PBB SHADOW E&M-EST. PATIENT-LVL IV: CPT | Mod: PBBFAC,,, | Performed by: NURSE PRACTITIONER

## 2024-07-17 PROCEDURE — 99213 OFFICE O/P EST LOW 20 MIN: CPT | Mod: S$PBB,,, | Performed by: NURSE PRACTITIONER

## 2024-07-17 NOTE — PATIENT INSTRUCTIONS
Call PSA results from today   Repeat PSA in 6 months   EMILIANO at next visit   Desires not to start any medication to help him void   Continue Cialis 10 mg as prescribed   Follow up with Urology NP KENNY Leiva in 6 months

## 2024-07-18 ENCOUNTER — TELEPHONE (OUTPATIENT)
Dept: UROLOGY | Facility: CLINIC | Age: 74
End: 2024-07-18
Payer: MEDICARE

## 2024-07-18 NOTE — TELEPHONE ENCOUNTER
I called pt and told him his PSA is 7.020 and that is lower than it has been.  Keep his 6 month followup appointment for PSA.  That appt is scheduled for  1-21-25 at 8:40am.  He voiced understanding.

## 2024-07-23 ENCOUNTER — OFFICE VISIT (OUTPATIENT)
Dept: INTERNAL MEDICINE | Facility: CLINIC | Age: 74
End: 2024-07-23
Payer: MEDICARE

## 2024-07-23 VITALS
HEART RATE: 61 BPM | OXYGEN SATURATION: 96 % | BODY MASS INDEX: 27.85 KG/M2 | TEMPERATURE: 98 F | WEIGHT: 188 LBS | HEIGHT: 69 IN | SYSTOLIC BLOOD PRESSURE: 130 MMHG | DIASTOLIC BLOOD PRESSURE: 70 MMHG

## 2024-07-23 DIAGNOSIS — R03.0 ELEVATED BLOOD PRESSURE READING: ICD-10-CM

## 2024-07-23 DIAGNOSIS — R97.20 ELEVATED PSA: ICD-10-CM

## 2024-07-23 DIAGNOSIS — K21.9 GASTROESOPHAGEAL REFLUX DISEASE, UNSPECIFIED WHETHER ESOPHAGITIS PRESENT: ICD-10-CM

## 2024-07-23 DIAGNOSIS — R55 NEAR SYNCOPE: ICD-10-CM

## 2024-07-23 DIAGNOSIS — Z80.0 FAMILY HISTORY OF COLON CANCER: ICD-10-CM

## 2024-07-23 DIAGNOSIS — Z09 FOLLOW-UP EXAM: Primary | ICD-10-CM

## 2024-07-23 DIAGNOSIS — G47.33 OSA (OBSTRUCTIVE SLEEP APNEA): ICD-10-CM

## 2024-07-23 PROCEDURE — 99214 OFFICE O/P EST MOD 30 MIN: CPT | Mod: PBBFAC | Performed by: INTERNAL MEDICINE

## 2024-07-23 PROCEDURE — 99214 OFFICE O/P EST MOD 30 MIN: CPT | Mod: S$PBB,,, | Performed by: INTERNAL MEDICINE

## 2024-07-23 PROCEDURE — 99999 PR PBB SHADOW E&M-EST. PATIENT-LVL IV: CPT | Mod: PBBFAC,,, | Performed by: INTERNAL MEDICINE

## 2024-07-23 RX ORDER — ESOMEPRAZOLE MAGNESIUM 40 MG/1
40 CAPSULE, DELAYED RELEASE ORAL
Qty: 90 CAPSULE | Refills: 3 | Status: SHIPPED | OUTPATIENT
Start: 2024-07-23

## 2024-07-23 NOTE — PROGRESS NOTES
Subjective:       Patient ID: Praful Esposito III is a 74 y.o. male.    Chief Complaint: Follow-up (3m fu /Pt c/o of not resting well sates he is having some dizziness, and passing out spells but he states this happens whenever he lift weights)    The patient is a 71-year-old white male the presents today for follow-up.  History of GERD and BPH.  At his last visit he complained of cough and wheezing.  Chest x-ray showed no acute process.  COVID swab was negative.  We prescribed him a Ventolin inhaler and his cough has resolved.  No complaints today.  His systolic blood pressure is  Elevated.  No history of hypertension.  He is currently resting comfortably in no distress.  He is afebrile and other than systolic blood pressure vital signs are stable.  He states systolic blood pressure normally runs in the 130s to 140s.      7/23/24-Mr. Esposito is a 74-year-old male the presents today for follow-up.  He is up-to-date on age-appropriate health screenings.  Last colonoscopy was done Carole 15, 2023 with recommendations for repeat in 3 years.  Blood pressure looks better today.  It is 130/70.  He has not had any further syncopal episodes but he has had several episodes of near-syncope since we last saw him.  These tend to occur when he is working out.  It seems to happen when he changes positions during these workouts.  He denies any palpitations, chest pain, or shortness a breath.  He did have positive orthostatics last time.  His only other complaint is that he does not feel rested.  He does not have problems getting asleep or staying asleep.  He just feels like he did not get good sleep.  He had a sleep study done about 5 years ago and he did have some obstructive sleep apnea.  He does not use a CPAP.  Otherwise he is resting comfortably.  He is afebrile and vital signs are stable.    Follow-up  Associated symptoms include arthralgias. Pertinent negatives include no abdominal pain, chest pain, chills, coughing, fatigue,  fever, headaches, joint swelling, myalgias, nausea, neck pain, rash, sore throat, vomiting or weakness.     Review of Systems   Constitutional:  Negative for activity change, appetite change, chills, fatigue, fever and unexpected weight change.   HENT:  Negative for ear pain, hearing loss, rhinorrhea, sinus pressure/congestion, sore throat and trouble swallowing.    Eyes:  Negative for pain, discharge, redness and visual disturbance.   Respiratory:  Negative for apnea, cough, chest tightness, shortness of breath and wheezing.    Cardiovascular:  Negative for chest pain, palpitations and leg swelling.   Gastrointestinal:  Negative for abdominal pain, blood in stool, constipation, diarrhea, nausea and vomiting.   Endocrine: Negative for cold intolerance, heat intolerance, polydipsia and polyuria.   Genitourinary:  Negative for difficulty urinating, dysuria, hematuria and urgency.   Musculoskeletal:  Positive for arthralgias. Negative for back pain, joint swelling, myalgias and neck pain.   Integumentary:  Negative for pallor and rash.   Allergic/Immunologic: Negative for frequent infections.   Neurological:  Positive for dizziness. Negative for tremors, seizures, weakness and headaches.   Hematological:  Negative for adenopathy.   Psychiatric/Behavioral:  Negative for confusion, dysphoric mood and sleep disturbance. The patient is not nervous/anxious.          Objective:      Physical Exam  Vitals and nursing note reviewed.   Constitutional:       General: He is not in acute distress.     Appearance: Normal appearance. He is not ill-appearing.   HENT:      Head: Normocephalic and atraumatic.      Right Ear: External ear normal.      Left Ear: External ear normal.      Nose: Nose normal.      Mouth/Throat:      Pharynx: Oropharynx is clear.   Eyes:      Extraocular Movements: Extraocular movements intact.      Conjunctiva/sclera: Conjunctivae normal.      Pupils: Pupils are equal, round, and reactive to light.   Neck:       Vascular: No carotid bruit.   Cardiovascular:      Rate and Rhythm: Normal rate and regular rhythm.      Pulses: Normal pulses.      Heart sounds: Normal heart sounds. No murmur heard.  Pulmonary:      Effort: No respiratory distress.      Breath sounds: No wheezing or rales.   Abdominal:      General: Bowel sounds are normal. There is no distension.      Palpations: Abdomen is soft.   Musculoskeletal:         General: Normal range of motion.      Cervical back: Normal range of motion and neck supple.      Right lower leg: No edema.      Left lower leg: No edema.   Skin:     General: Skin is warm and dry.      Capillary Refill: Capillary refill takes less than 2 seconds.      Coloration: Skin is not pale.   Neurological:      General: No focal deficit present.      Mental Status: He is alert and oriented to person, place, and time.      Cranial Nerves: No cranial nerve deficit.      Sensory: No sensory deficit.      Motor: No weakness.   Psychiatric:         Mood and Affect: Mood normal.         Judgment: Judgment normal.         Assessment:       1. Follow-up exam    2. Elevated blood pressure reading    3. Elevated PSA    4. Family history of colon cancer    5. Gastroesophageal reflux disease, unspecified whether esophagitis present    6. Near syncope    7. DINO (obstructive sleep apnea)        Plan:       1. The patient presents today for follow-up.    He had his colonoscopy done in June 2023.  He had some diverticula and 4 polyps which were removed.  Recommendations were for repeat in 3 years.  We do need to set him up for AAA screening.  Can work on that for him.    2. Hearing loss  he did have hearing testing done.  Currently no acute issues    3. Elevated blood pressure reading-no diagnosis hypertension.  Blood pressure today  130/70    4. BPH-stable.  PSA 7.02 down from 8.96.  This is stable He follows with Urology--biopsy and MRI both negative    5. GERD-stable on Nexium-we will send in a refill    6.  Rash-comes and goes.  On the face.  Rosacea?  He has an appointment scheduled with Dermatology in October.  3/2/23-he had follow-up with Dermatology in his issues have resolved    7. Erectile dysfunction-testosterone is in normal range but towards the bottom of normal.  Tadalafil p.r.n..  He has inquired about hormone replacement therapy.  I will do some research and get back with a  9/11/23-he started a DHEA supplement over-the-counter and this has improved his symptoms.  Given his normal testosterone level no indications for testosterone replacement    8. Near-syncope-2  Sounds more like vasovagal syncope or orthostasis.  We did check orthostatic vitals and he does have a 10 mm of mercury drop diastolic.  This certainly could be contributing.  We have discussed changing positions slowly and carefully.  He is mildly bradycardic today as well.  If he has another issue we are going to order a stress test for him.  7/23/24-he continues to have some episodes where he feels dizzy and near syncopal.  Mostly occur when he is exerting himself and bending over and raising back up.  We are going to have him wear a Holter monitor for 48 hours.  He should have this on while he is doing his workout.    9. Sleep difficulty-mainly just does not feel rested.  He had a sleep study done about 5 years ago and he did have some obstructive sleep apnea.  He does not use a CPAP.  We are going to see about getting him back in with sleep medicine.    Billing for this encounter is based on 32 minutes of time spent also moderate level of medical decision-making    Return to care in 6 months or sooner if needed.  We will repeat labs at next visit

## 2024-07-24 ENCOUNTER — HOSPITAL ENCOUNTER (OUTPATIENT)
Dept: CARDIOLOGY | Facility: HOSPITAL | Age: 74
Discharge: HOME | End: 2024-07-24
Attending: INTERNAL MEDICINE
Payer: MEDICARE

## 2024-07-24 DIAGNOSIS — R55 NEAR SYNCOPE: ICD-10-CM

## 2024-07-24 PROCEDURE — 93226 XTRNL ECG REC<48 HR SCAN A/R: CPT

## 2024-07-26 LAB
OHS CV EVENT MONITOR DAY: 0
OHS CV HOLTER LENGTH DECIMAL HOURS: 48
OHS CV HOLTER LENGTH HOURS: 48
OHS CV HOLTER LENGTH MINUTES: 0
OHS CV HOLTER SINUS AVERAGE HR: 64
OHS CV HOLTER SINUS MAX HR: 132
OHS CV HOLTER SINUS MIN HR: 47

## 2024-09-04 ENCOUNTER — HOSPITAL ENCOUNTER (EMERGENCY)
Facility: HOSPITAL | Age: 74
Discharge: HOME | End: 2024-09-04
Attending: EMERGENCY MEDICINE
Payer: MEDICARE

## 2024-09-04 VITALS
DIASTOLIC BLOOD PRESSURE: 70 MMHG | RESPIRATION RATE: 12 BRPM | HEART RATE: 50 BPM | HEIGHT: 68 IN | OXYGEN SATURATION: 95 % | SYSTOLIC BLOOD PRESSURE: 151 MMHG | BODY MASS INDEX: 27.89 KG/M2 | WEIGHT: 184 LBS | TEMPERATURE: 98 F

## 2024-09-04 DIAGNOSIS — R55 SYNCOPE: Primary | ICD-10-CM

## 2024-09-04 DIAGNOSIS — U07.1 COVID-19 VIRUS DETECTED: ICD-10-CM

## 2024-09-04 DIAGNOSIS — U07.1 COVID: ICD-10-CM

## 2024-09-04 DIAGNOSIS — N39.0 URINARY TRACT INFECTION WITHOUT HEMATURIA, SITE UNSPECIFIED: ICD-10-CM

## 2024-09-04 LAB
ALBUMIN SERPL BCP-MCNC: 3.5 G/DL (ref 3.5–5)
ALBUMIN/GLOB SERPL: 1.3 {RATIO}
ALP SERPL-CCNC: 72 U/L (ref 45–115)
ALT SERPL W P-5'-P-CCNC: 25 U/L (ref 16–61)
ANION GAP SERPL CALCULATED.3IONS-SCNC: 12 MMOL/L (ref 7–16)
AST SERPL W P-5'-P-CCNC: 15 U/L (ref 15–37)
BACTERIA #/AREA URNS HPF: ABNORMAL /HPF
BASOPHILS # BLD AUTO: 0.03 K/UL (ref 0–0.2)
BASOPHILS NFR BLD AUTO: 0.4 % (ref 0–1)
BILIRUB SERPL-MCNC: 0.5 MG/DL (ref ?–1.2)
BILIRUB UR QL STRIP: NEGATIVE
BUN SERPL-MCNC: 17 MG/DL (ref 7–18)
BUN/CREAT SERPL: 20 (ref 6–20)
CALCIUM SERPL-MCNC: 8.1 MG/DL (ref 8.5–10.1)
CHLORIDE SERPL-SCNC: 112 MMOL/L (ref 98–107)
CLARITY UR: CLEAR
CO2 SERPL-SCNC: 20 MMOL/L (ref 21–32)
COLOR UR: YELLOW
CREAT SERPL-MCNC: 0.87 MG/DL (ref 0.7–1.3)
DIFFERENTIAL METHOD BLD: ABNORMAL
EGFR (NO RACE VARIABLE) (RUSH/TITUS): 90 ML/MIN/1.73M2
EOSINOPHIL # BLD AUTO: 0.04 K/UL (ref 0–0.5)
EOSINOPHIL NFR BLD AUTO: 0.5 % (ref 1–4)
ERYTHROCYTE [DISTWIDTH] IN BLOOD BY AUTOMATED COUNT: 12.3 % (ref 11.5–14.5)
GLOBULIN SER-MCNC: 2.7 G/DL (ref 2–4)
GLUCOSE SERPL-MCNC: 118 MG/DL (ref 74–106)
GLUCOSE UR STRIP-MCNC: NORMAL MG/DL
HCT VFR BLD AUTO: 38.3 % (ref 40–54)
HGB BLD-MCNC: 12.5 G/DL (ref 13.5–18)
HYALINE CASTS #/AREA URNS LPF: ABNORMAL /LPF
IMM GRANULOCYTES # BLD AUTO: 0.03 K/UL (ref 0–0.04)
IMM GRANULOCYTES NFR BLD: 0.4 % (ref 0–0.4)
KETONES UR STRIP-SCNC: NEGATIVE MG/DL
LEUKOCYTE ESTERASE UR QL STRIP: ABNORMAL
LYMPHOCYTES # BLD AUTO: 0.78 K/UL (ref 1–4.8)
LYMPHOCYTES NFR BLD AUTO: 10.5 % (ref 27–41)
MAGNESIUM SERPL-MCNC: 2 MG/DL (ref 1.7–2.3)
MCH RBC QN AUTO: 29.5 PG (ref 27–31)
MCHC RBC AUTO-ENTMCNC: 32.6 G/DL (ref 32–36)
MCV RBC AUTO: 90.3 FL (ref 80–96)
MONOCYTES # BLD AUTO: 0.49 K/UL (ref 0–0.8)
MONOCYTES NFR BLD AUTO: 6.6 % (ref 2–6)
MPC BLD CALC-MCNC: 9.1 FL (ref 9.4–12.4)
MUCOUS, UA: ABNORMAL /LPF
NEUTROPHILS # BLD AUTO: 6.06 K/UL (ref 1.8–7.7)
NEUTROPHILS NFR BLD AUTO: 81.6 % (ref 53–65)
NITRITE UR QL STRIP: NEGATIVE
NRBC # BLD AUTO: 0 X10E3/UL
NRBC, AUTO (.00): 0 %
NT-PROBNP SERPL-MCNC: 60 PG/ML (ref 1–450)
PH UR STRIP: 5.5 PH UNITS
PLATELET # BLD AUTO: 199 K/UL (ref 150–400)
POTASSIUM SERPL-SCNC: 3.6 MMOL/L (ref 3.5–5.1)
PROT SERPL-MCNC: 6.2 G/DL (ref 6.4–8.2)
PROT UR QL STRIP: 30
RBC # BLD AUTO: 4.24 M/UL (ref 4.6–6.2)
RBC # UR STRIP: NEGATIVE /UL
RBC #/AREA URNS HPF: 2 /HPF
SARS-COV-2 RDRP RESP QL NAA+PROBE: POSITIVE
SODIUM SERPL-SCNC: 140 MMOL/L (ref 136–145)
SP GR UR STRIP: 1.02
SQUAMOUS #/AREA URNS LPF: ABNORMAL /HPF
TROPONIN I SERPL DL<=0.01 NG/ML-MCNC: 6.7 PG/ML
UROBILINOGEN UR STRIP-ACNC: NORMAL MG/DL
WBC # BLD AUTO: 7.43 K/UL (ref 4.5–11)
WBC #/AREA URNS HPF: 11 /HPF

## 2024-09-04 PROCEDURE — 63600175 PHARM REV CODE 636 W HCPCS: Performed by: EMERGENCY MEDICINE

## 2024-09-04 PROCEDURE — 36415 COLL VENOUS BLD VENIPUNCTURE: CPT | Performed by: EMERGENCY MEDICINE

## 2024-09-04 PROCEDURE — 96365 THER/PROPH/DIAG IV INF INIT: CPT

## 2024-09-04 PROCEDURE — 84484 ASSAY OF TROPONIN QUANT: CPT | Performed by: EMERGENCY MEDICINE

## 2024-09-04 PROCEDURE — 87086 URINE CULTURE/COLONY COUNT: CPT | Performed by: EMERGENCY MEDICINE

## 2024-09-04 PROCEDURE — 99285 EMERGENCY DEPT VISIT HI MDM: CPT | Mod: 25

## 2024-09-04 PROCEDURE — 93010 ELECTROCARDIOGRAM REPORT: CPT | Mod: ,,, | Performed by: STUDENT IN AN ORGANIZED HEALTH CARE EDUCATION/TRAINING PROGRAM

## 2024-09-04 PROCEDURE — 83880 ASSAY OF NATRIURETIC PEPTIDE: CPT | Performed by: EMERGENCY MEDICINE

## 2024-09-04 PROCEDURE — 93005 ELECTROCARDIOGRAM TRACING: CPT

## 2024-09-04 PROCEDURE — 87635 SARS-COV-2 COVID-19 AMP PRB: CPT | Performed by: EMERGENCY MEDICINE

## 2024-09-04 PROCEDURE — 81003 URINALYSIS AUTO W/O SCOPE: CPT | Performed by: EMERGENCY MEDICINE

## 2024-09-04 PROCEDURE — 81001 URINALYSIS AUTO W/SCOPE: CPT | Performed by: EMERGENCY MEDICINE

## 2024-09-04 PROCEDURE — 85025 COMPLETE CBC W/AUTO DIFF WBC: CPT | Performed by: EMERGENCY MEDICINE

## 2024-09-04 PROCEDURE — 80053 COMPREHEN METABOLIC PANEL: CPT | Performed by: EMERGENCY MEDICINE

## 2024-09-04 PROCEDURE — 83735 ASSAY OF MAGNESIUM: CPT | Performed by: EMERGENCY MEDICINE

## 2024-09-04 PROCEDURE — 25000003 PHARM REV CODE 250: Performed by: EMERGENCY MEDICINE

## 2024-09-04 RX ORDER — ONDANSETRON 4 MG/1
4 TABLET, ORALLY DISINTEGRATING ORAL
Status: COMPLETED | OUTPATIENT
Start: 2024-09-04 | End: 2024-09-04

## 2024-09-04 RX ORDER — CEFDINIR 300 MG/1
300 CAPSULE ORAL 2 TIMES DAILY
Qty: 20 CAPSULE | Refills: 0 | Status: SHIPPED | OUTPATIENT
Start: 2024-09-04 | End: 2024-09-14

## 2024-09-04 RX ADMIN — CEFTRIAXONE SODIUM 2 G: 2 INJECTION, POWDER, FOR SOLUTION INTRAMUSCULAR; INTRAVENOUS at 11:09

## 2024-09-04 RX ADMIN — ONDANSETRON 4 MG: 4 TABLET, ORALLY DISINTEGRATING ORAL at 09:09

## 2024-09-04 RX ADMIN — SODIUM CHLORIDE 1000 ML: 9 INJECTION, SOLUTION INTRAVENOUS at 10:09

## 2024-09-04 NOTE — DISCHARGE INSTRUCTIONS
TAKE ANTIBIOTIC AS DIRECTED.  FOLLOW UP WITH CARDIOLOGY.  A REFERRAL HAS BEEN PLACED FOR YOU IN THIS REGARD.  RETURN TO THE EMERGENCY DEPARTMENT AS NEEDED.

## 2024-09-04 NOTE — ED PROVIDER NOTES
Encounter Date: 9/4/2024    SCRIBE #1 NOTE: I, Silke Fernando, am scribing for, and in the presence of,  Conor Pickard MD. I have scribed the entire note.       History     Chief Complaint   Patient presents with    Loss of Consciousness     While working out at gym, bent over      74 y.o.male presented to the ED for loss of consciousness that happened today. He was working out and he fainted. He has experience fainting before when he was working around his home. He denies any pain or injuries.PT has no HX of smoking and medical HX of Acute bronchitis, Benign prostatic hyperplasia with lower urinary tract symptoms, Bladder neck obstruction, Chronic prostatitis, Dysplasia of prostate, Elevated PSA, Fatigue, and Urinary tract infection.        Review of patient's allergies indicates:   Allergen Reactions    Penicillin g benzathine Other (See Comments)     Past Medical History:   Diagnosis Date    Acute bronchitis     Benign prostatic hyperplasia with lower urinary tract symptoms     Bladder neck obstruction     Chronic prostatitis     Dysplasia of prostate     Elevated PSA     Fatigue     Urinary tract infection      Past Surgical History:   Procedure Laterality Date    BACK SURGERY      CARPAL TUNNEL RELEASE Right 10/24/2022    Procedure: RELEASE, CARPAL TUNNEL;  Surgeon: Jose Pickard III, MD;  Location: [facility];  Service: Orthopedics;  Laterality: Right;    CARPAL TUNNEL RELEASE Left 12/5/2022    Procedure: RELEASE, CARPAL TUNNEL;  Surgeon: Jose Pickard III, MD;  Location: [facility] OR;  Service: Orthopedics;  Laterality: Left;    TONSILLECTOMY      TRUS Biopsy of prostate       Family History   Problem Relation Name Age of Onset    Clotting disorder Other      Cancer Other      Dementia Other      Heart disease Other      Cancer Father Praful Esposito      Social History     Tobacco Use    Smoking status: Former     Current packs/day: 0.00     Average packs/day: 0.5 packs/day for 3.0 years  (1.5 ttl pk-yrs)     Types: Cigarettes     Start date:      Quit date:      Years since quittin.7    Smokeless tobacco: Former     Types: Chew   Substance Use Topics    Alcohol use: Not Currently     Comment: occassionaly    Drug use: Never     Review of Systems   Neurological:  Positive for dizziness and weakness.   All other systems reviewed and are negative.      Physical Exam     Initial Vitals [24 0903]   BP Pulse Resp Temp SpO2   126/71 68 14 97.8 °F (36.6 °C) 97 %      MAP       --         Physical Exam    Nursing note and vitals reviewed.  Constitutional: He appears well-developed and well-nourished.   HENT:   Head: Normocephalic and atraumatic.   Eyes: Conjunctivae and EOM are normal. Pupils are equal, round, and reactive to light.   Neck: Neck supple.   Normal range of motion.  Cardiovascular:  Normal rate, regular rhythm, normal heart sounds and intact distal pulses.           Pulmonary/Chest: Breath sounds normal.   Abdominal: Abdomen is soft. Bowel sounds are normal.   Musculoskeletal:         General: Normal range of motion.      Cervical back: Normal range of motion and neck supple.     Neurological: He is alert and oriented to person, place, and time. He has normal strength.   Skin: Skin is warm and dry. Capillary refill takes less than 2 seconds.   Psychiatric: He has a normal mood and affect. Thought content normal.         ED Course   Procedures  Labs Reviewed   COMPREHENSIVE METABOLIC PANEL - Abnormal       Result Value    Sodium 140      Potassium 3.6      Chloride 112 (*)     CO2 20 (*)     Anion Gap 12      Glucose 118 (*)     BUN 17      Creatinine 0.87      BUN/Creatinine Ratio 20      Calcium 8.1 (*)     Total Protein 6.2 (*)     Albumin 3.5      Globulin 2.7      A/G Ratio 1.3      Bilirubin, Total 0.5      Alk Phos 72      ALT 25      AST 15      eGFR 90     URINALYSIS, REFLEX TO URINE CULTURE - Abnormal    Color, UA Yellow      Clarity, UA Clear      pH, UA 5.5       Leukocytes, UA Trace (*)     Nitrites, UA Negative      Protein, UA 30 (*)     Glucose, UA Normal      Ketones, UA Negative      Urobilinogen, UA Normal      Bilirubin, UA Negative      Blood, UA Negative      Specific Gravity, UA 1.018     CBC WITH DIFFERENTIAL - Abnormal    WBC 7.43      RBC 4.24 (*)     Hemoglobin 12.5 (*)     Hematocrit 38.3 (*)     MCV 90.3      MCH 29.5      MCHC 32.6      RDW 12.3      Platelet Count 199      MPV 9.1 (*)     Neutrophils % 81.6 (*)     Lymphocytes % 10.5 (*)     Monocytes % 6.6 (*)     Eosinophils % 0.5 (*)     Basophils % 0.4      Immature Granulocytes % 0.4      nRBC, Auto 0.0      Neutrophils, Abs 6.06      Lymphocytes, Absolute 0.78 (*)     Monocytes, Absolute 0.49      Eosinophils, Absolute 0.04      Basophils, Absolute 0.03      Immature Granulocytes, Absolute 0.03      nRBC, Absolute 0.00      Diff Type Auto     SARS-COV-2 RNA AMPLIFICATION, QUAL - Abnormal    SARS COV-2 Molecular Positive (*)    URINALYSIS, MICROSCOPIC - Abnormal    WBC, UA 11 (*)     RBC, UA 2      Bacteria, UA Few (*)     Squamous Epithelial Cells, UA Occasional (*)     Hyaline Casts, UA 10-25 (*)     Mucous Occasional (*)    NT-PRO NATRIURETIC PEPTIDE - Normal    ProBNP 60     MAGNESIUM - Normal    Magnesium 2.0     TROPONIN I - Normal    Troponin I High Sensitivity 6.7     CULTURE, URINE    Culture, Urine Skin/Urogenital Aisha Isolated, no further workup.     CBC W/ AUTO DIFFERENTIAL    Narrative:     The following orders were created for panel order CBC auto differential.  Procedure                               Abnormality         Status                     ---------                               -----------         ------                     CBC with Differential[[id number]]       Abnormal            Final result               Manual Differential[[id number]]                                                          Please view results for these tests on the individual orders.        ECG Results               EKG 12-lead (Final result)        Collection Time Result Time QRS Duration OHS QTC Calculation    09/04/24 08:58:23 09/21/24 14:17:38 84 437                     Final result by Interface, Lab In [facility] (09/21/24 14:17:47)                   Narrative:    Test Reason : R55,    Vent. Rate : 064 BPM     Atrial Rate : 000 BPM     P-R Int : 190 ms          QRS Dur : 084 ms      QT Int : 430 ms       P-R-T Axes : 010 047 070 degrees     QTc Int : 437 ms    Sinus arrhythmia  Normal ECG    Confirmed by Richard Wan DO ([id number]) on 9/21/2024 2:17:33 PM    Referred By: [email or web address]   SELF           Confirmed By:Richard Wan DO                                  Imaging Results              CT Head Without Contrast (Final result)  Result time 09/04/24 09:25:25      Final result by Casey Rogers MD (09/04/24 09:25:25)                   Impression:      No acute intracranial findings by noncontrast CT.      Electronically signed by: Casey Rogers  Date:    09/04/2024  Time:    09:25               Narrative:    EXAMINATION:  CT HEAD WITHOUT CONTRAST    CLINICAL HISTORY:  SYNCOPE AND FALL;    TECHNIQUE:  Low dose axial images were obtained through the head.  Coronal and sagittal reformations were also performed. Contrast was not administered.    COMPARISON:  None.    FINDINGS:  Blood: No acute intracranial hemorrhage.    Parenchyma: No definite loss of gray-white differentiation to suggest acute or subacute transcortical infarct. Generalized pattern of age-related parenchymal volume loss.  Nonspecific areas of white matter hypoattenuation may reflect sequela of chronic small vessel ischemic disease.    Ventricles/Extra-axial spaces: No abnormal extra-axial fluid collection. Basal cisterns are patent.    Vessels: Mild atherosclerotic calcifications.    Orbits: Unremarkable.    Scalp: Unremarkable.    Skull: There are no depressed skull fractures or destructive bone lesions.    Sinuses and mastoids: Relatively minimal  paranasal sinus mucosal thickening.  Chronic appearing partial opacification of the dependent right mastoid air cells.    Other findings: None                                       X-Ray Chest AP Portable (Final result)  Result time 09/04/24 09:23:04      Final result by Casey Rogers MD (09/04/24 09:23:04)                   Impression:      No convincing evidence of acute cardiopulmonary disease.      Electronically signed by: Casey Rogers  Date:    09/04/2024  Time:    09:23               Narrative:    EXAMINATION:  XR CHEST AP PORTABLE    CLINICAL HISTORY:  Syncope and collapse    TECHNIQUE:  Single frontal view of the chest was performed.    COMPARISON:  Chest radiograph performed 07/07/2021, 09:01 hours.    FINDINGS:  Monitoring leads overlie the chest.  Cardiomediastinal contours appear to be within normal limits.    Lungs essentially clear.    No definite pneumothorax or large volume pleural effusion.    No acute findings in the visualized abdomen.    Osseous and soft tissue structures appear without definite acute change.                                       Medications   ondansetron disintegrating tablet 4 mg (4 mg Oral Given 9/4/24 0913)   sodium chloride 0.9% bolus 1,000 mL 1,000 mL (0 mLs Intravenous Stopped 9/4/24 1150)   cefTRIAXone (ROCEPHIN) 2 g in D5W 100 mL IVPB (MB+) (0 g Intravenous Stopped 9/4/24 1150)     Medical Decision Making  SYNCOPE WITH EXERTION.      DDX:  VASOVAGAL VS CARDIAC VS OTHER    VASOVAGAL SECONDARY TO COVID AND UTI    ABX FOR UTI.  OUTPATIENT FOLLOW UP.            Amount and/or Complexity of Data Reviewed  Labs: ordered. Decision-making details documented in ED Course.  Radiology: ordered. Decision-making details documented in ED Course.  ECG/medicine tests: ordered. Decision-making details documented in ED Course.    Risk  Prescription drug management.              Attending Attestation:           Physician Attestation for Scribe:  Physician Attestation Statement for  Scribe #1: I, Conor Pickard MD, reviewed documentation, as scribed by Silke Roldan in my presence, and it is both accurate and complete.                                    Clinical Impression:  Final diagnoses:  [R55] Syncope (Primary)  [U07.1] COVID  [N39.0] Urinary tract infection without hematuria, site unspecified          ED Disposition Condition    Discharge Stable          ED Prescriptions       Medication Sig Dispense Start Date End Date Auth. Provider    cefdinir (OMNICEF) 300 MG capsule () Take 1 capsule (300 mg total) by mouth 2 (two) times daily. for 10 days 20 capsule 2024 Conor Pickard MD          Follow-up Information       Follow up With Specialties Details Why Contact Info    Chandra De La Rosa, DO Critical Care Medicine, Internal Medicine  As needed [address]  [place] [address]  [phone]               Conor Pickard MD  [phone]

## 2024-09-05 ENCOUNTER — TELEPHONE (OUTPATIENT)
Dept: EMERGENCY MEDICINE | Facility: HOSPITAL | Age: 74
End: 2024-09-05
Payer: MEDICARE

## 2024-09-06 LAB — UA COMPLETE W REFLEX CULTURE PNL UR: NORMAL

## 2024-09-21 LAB
OHS QRS DURATION: 84 MS
OHS QTC CALCULATION: 437 MS

## 2024-10-02 ENCOUNTER — OFFICE VISIT (OUTPATIENT)
Dept: CARDIOLOGY | Facility: CLINIC | Age: 74
End: 2024-10-02
Payer: MEDICARE

## 2024-10-02 VITALS
HEIGHT: 68 IN | BODY MASS INDEX: 28.79 KG/M2 | SYSTOLIC BLOOD PRESSURE: 140 MMHG | HEART RATE: 62 BPM | WEIGHT: 190 LBS | DIASTOLIC BLOOD PRESSURE: 82 MMHG | RESPIRATION RATE: 18 BRPM

## 2024-10-02 DIAGNOSIS — I10 PRIMARY HYPERTENSION: Primary | ICD-10-CM

## 2024-10-02 DIAGNOSIS — R55 SYNCOPE, UNSPECIFIED SYNCOPE TYPE: Primary | ICD-10-CM

## 2024-10-02 DIAGNOSIS — R55 SYNCOPE: ICD-10-CM

## 2024-10-02 PROCEDURE — 99999 PR PBB SHADOW E&M-EST. PATIENT-LVL IV: CPT | Mod: PBBFAC,,, | Performed by: STUDENT IN AN ORGANIZED HEALTH CARE EDUCATION/TRAINING PROGRAM

## 2024-10-02 PROCEDURE — 99204 OFFICE O/P NEW MOD 45 MIN: CPT | Mod: S$PBB,,, | Performed by: STUDENT IN AN ORGANIZED HEALTH CARE EDUCATION/TRAINING PROGRAM

## 2024-10-02 PROCEDURE — 99214 OFFICE O/P EST MOD 30 MIN: CPT | Mod: PBBFAC | Performed by: STUDENT IN AN ORGANIZED HEALTH CARE EDUCATION/TRAINING PROGRAM

## 2024-10-02 NOTE — ASSESSMENT & PLAN NOTE
Likely vasovagal in the setting of recovering from viral illness  Will get ECHO  If symptoms become frequent may warrant further examination

## 2024-10-02 NOTE — PROGRESS NOTES
PCP: Chandra De La Rosa DO    Referring Provider:     Subjective:   Praful Esposito III is a 74 y.o. male with hx of GERD who presents for evaluation of syncope.    Patient recently sick with URI (likely covid) who on day 9 while working out in the gym had a syncopal episode. He reports having 2 other episode in the past 5 years. His Holter is normal. Blood work unremarkable.     Fhx: non-contributary  Shx: Denies smoking, etoh or drug use    EKG - 9/4/24 - Sinus arrhythmia        Lab Results   Component Value Date     09/04/2024    K 3.6 09/04/2024     (H) 09/04/2024    CO2 20 (L) 09/04/2024    BUN 17 09/04/2024    CREATININE 0.87 09/04/2024    CALCIUM 8.1 (L) 09/04/2024    ANIONGAP 12 09/04/2024    EGFRNONAA 98 11/30/2021       Lab Results   Component Value Date    CHOL 167 03/07/2024    CHOL 129 09/08/2022     Lab Results   Component Value Date    HDL 43 03/07/2024    HDL 36 (L) 09/08/2022     Lab Results   Component Value Date    LDLCALC 111 03/07/2024    LDLCALC 82 09/08/2022     Lab Results   Component Value Date    TRIG 67 03/07/2024    TRIG 53 09/08/2022     Lab Results   Component Value Date    CHOLHDL 3.9 03/07/2024    CHOLHDL 3.6 09/08/2022       Lab Results   Component Value Date    WBC 7.43 09/04/2024    HGB 12.5 (L) 09/04/2024    HCT 38.3 (L) 09/04/2024    MCV 90.3 09/04/2024     09/04/2024           Current Outpatient Medications:     esomeprazole (NEXIUM) 40 MG capsule, Take 1 capsule (40 mg total) by mouth before breakfast., Disp: 90 capsule, Rfl: 3    ibuprofen (ADVIL,MOTRIN) 400 MG tablet, Take 400 mg by mouth nightly as needed for Other., Disp: , Rfl:     methocarbamol (ROBAXIN-750 ORAL), Take 2 tablets by mouth as needed., Disp: , Rfl:     tadalafiL (CIALIS) 10 MG tablet, Take 10 mg by mouth daily as needed., Disp: , Rfl:     Review of Systems   Respiratory:  Negative for cough and shortness of breath.    Cardiovascular:  Negative for chest pain, palpitations, orthopnea,  "claudication, leg swelling and PND.         Objective:   BP (!) 140/82   Pulse 62   Resp 18   Ht 5' 8" (1.727 m)   Wt 86.2 kg (190 lb)   BMI 28.89 kg/m²     Physical Exam  Vitals and nursing note reviewed.   Constitutional:       Appearance: Normal appearance.   Cardiovascular:      Rate and Rhythm: Normal rate and regular rhythm.      Pulses: Normal pulses.      Heart sounds: Normal heart sounds.   Pulmonary:      Breath sounds: Normal breath sounds.   Neurological:      Mental Status: He is alert and oriented to person, place, and time.           Assessment:     1. Primary hypertension        2. Syncope  Ambulatory referral/consult to Cardiology    Echo            Plan:   Syncope  Likely vasovagal in the setting of recovering from viral illness  Will get ECHO  If symptoms become frequent may warrant further examination    Primary hypertension  Possible based on home monitoring  Discussed maintaining BP log  Life-style changes          "

## 2024-10-16 ENCOUNTER — HOSPITAL ENCOUNTER (OUTPATIENT)
Dept: CARDIOLOGY | Facility: HOSPITAL | Age: 74
Discharge: HOME | End: 2024-10-16
Attending: STUDENT IN AN ORGANIZED HEALTH CARE EDUCATION/TRAINING PROGRAM
Payer: MEDICARE

## 2024-10-16 DIAGNOSIS — R55 SYNCOPE: ICD-10-CM

## 2024-10-16 LAB
AORTIC ROOT ANNULUS: 2.57 CM
AV INDEX (PROSTH): 0.81
AV MEAN GRADIENT: 4.6 MMHG
AV PEAK GRADIENT: 10.2 MMHG
AV VALVE AREA BY VELOCITY RATIO: 2.4 CM²
AV VALVE AREA: 3.1 CM²
AV VELOCITY RATIO: 0.63
CV ECHO LV RWT: 0.38 CM
DOP CALC AO PEAK VEL: 1.6 M/S
DOP CALC AO VTI: 38.3 CM
DOP CALC LVOT AREA: 3.8 CM2
DOP CALC LVOT DIAMETER: 2.2 CM
DOP CALC LVOT PEAK VEL: 1 M/S
DOP CALC LVOT STROKE VOLUME: 117.8 CM3
DOP CALCLVOT PEAK VEL VTI: 31 CM
E WAVE DECELERATION TIME: 270.55 MSEC
E/A RATIO: 0.93
E/E' RATIO: 8.33 M/S
ECHO LV POSTERIOR WALL: 0.9 CM (ref 0.6–1.1)
FRACTIONAL SHORTENING: 34 % (ref 28–44)
INTERVENTRICULAR SEPTUM: 1.1 CM (ref 0.6–1.1)
LEFT ATRIUM AREA SYSTOLIC (APICAL 2 CHAMBER): 19.86 CM2
LEFT ATRIUM AREA SYSTOLIC (APICAL 4 CHAMBER): 16.45 CM2
LEFT ATRIUM VOLUME MOD: 51.3 ML
LEFT INTERNAL DIMENSION IN SYSTOLE: 3.1 CM (ref 2.1–4)
LEFT VENTRICLE DIASTOLIC VOLUME: 101.64 ML
LEFT VENTRICLE END SYSTOLIC VOLUME APICAL 2 CHAMBER: 54.56 ML
LEFT VENTRICLE END SYSTOLIC VOLUME APICAL 4 CHAMBER: 41.74 ML
LEFT VENTRICLE SYSTOLIC VOLUME: 37.18 ML
LEFT VENTRICULAR INTERNAL DIMENSION IN DIASTOLE: 4.7 CM (ref 3.5–6)
LEFT VENTRICULAR MASS: 164.5 G
LV LATERAL E/E' RATIO: 6.82 M/S
LV SEPTAL E/E' RATIO: 10.71 M/S
LVED V (TEICH): 101.64 ML
LVES V (TEICH): 37.18 ML
LVOT MG: 1.95 MMHG
LVOT MV: 0.66 CM/S
MV PEAK A VEL: 0.81 M/S
MV PEAK E VEL: 0.75 M/S
OHS CV RV/LV RATIO: 0.66 CM
PISA TR MAX VEL: 2.15 M/S
PV PEAK GRADIENT: 3 MMHG
PV PEAK VELOCITY: 0.84 M/S
RA PRESSURE ESTIMATED: 3 MMHG
RA VOL SYS: 49.87 ML
RIGHT ATRIAL AREA: 17.9 CM2
RIGHT ATRIUM VOLUME AREA LENGTH APICAL 4 CHAMBER: 46.19 ML
RIGHT VENTRICLE DIASTOLIC BASEL DIMENSION: 3.1 CM
RIGHT VENTRICLE DIASTOLIC LENGTH: 6.6 CM
RIGHT VENTRICLE DIASTOLIC MID DIMENSION: 2.7 CM
RIGHT VENTRICULAR LENGTH IN DIASTOLE (APICAL 4-CHAMBER VIEW): 6.62 CM
RV MID DIAMA: 2.65 CM
RV TB RVSP: 5 MMHG
TDI LATERAL: 0.11 M/S
TDI SEPTAL: 0.07 M/S
TDI: 0.09 M/S
TR MAX PG: 18 MMHG
TRICUSPID ANNULAR PLANE SYSTOLIC EXCURSION: 2.56 CM
TV REST PULMONARY ARTERY PRESSURE: 21 MMHG

## 2024-10-16 PROCEDURE — 93306 TTE W/DOPPLER COMPLETE: CPT | Mod: 26,,, | Performed by: STUDENT IN AN ORGANIZED HEALTH CARE EDUCATION/TRAINING PROGRAM

## 2024-10-16 PROCEDURE — 93306 TTE W/DOPPLER COMPLETE: CPT

## 2025-02-10 ENCOUNTER — OFFICE VISIT (OUTPATIENT)
Dept: FAMILY MEDICINE | Facility: CLINIC | Age: 75
End: 2025-02-10
Payer: MEDICARE

## 2025-02-10 VITALS
WEIGHT: 188 LBS | SYSTOLIC BLOOD PRESSURE: 132 MMHG | BODY MASS INDEX: 28.49 KG/M2 | OXYGEN SATURATION: 97 % | TEMPERATURE: 98 F | DIASTOLIC BLOOD PRESSURE: 78 MMHG | HEIGHT: 68 IN | HEART RATE: 59 BPM

## 2025-02-10 DIAGNOSIS — R53.83 DECREASED ENERGY: Primary | ICD-10-CM

## 2025-02-10 DIAGNOSIS — N40.0 BENIGN PROSTATIC HYPERPLASIA, UNSPECIFIED WHETHER LOWER URINARY TRACT SYMPTOMS PRESENT: ICD-10-CM

## 2025-02-10 DIAGNOSIS — I10 PRIMARY HYPERTENSION: ICD-10-CM

## 2025-02-10 DIAGNOSIS — K21.9 GASTROESOPHAGEAL REFLUX DISEASE, UNSPECIFIED WHETHER ESOPHAGITIS PRESENT: ICD-10-CM

## 2025-02-10 DIAGNOSIS — R97.20 ELEVATED PSA: ICD-10-CM

## 2025-02-10 DIAGNOSIS — G47.33 OSA (OBSTRUCTIVE SLEEP APNEA): ICD-10-CM

## 2025-02-10 LAB
PSA SERPL-MCNC: 7.1 NG/ML
T4 FREE SERPL-MCNC: 0.97 NG/DL (ref 0.7–1.48)
TESTOST SERPL-MCNC: 343.9 NG/DL (ref 220.9–715.8)
TSH SERPL DL<=0.005 MIU/L-ACNC: 1.12 UIU/ML (ref 0.35–4.94)

## 2025-02-10 PROCEDURE — 84153 ASSAY OF PSA TOTAL: CPT | Mod: ,,, | Performed by: CLINICAL MEDICAL LABORATORY

## 2025-02-10 PROCEDURE — 84439 ASSAY OF FREE THYROXINE: CPT | Mod: ,,, | Performed by: CLINICAL MEDICAL LABORATORY

## 2025-02-10 PROCEDURE — 84403 ASSAY OF TOTAL TESTOSTERONE: CPT | Mod: ,,, | Performed by: CLINICAL MEDICAL LABORATORY

## 2025-02-10 PROCEDURE — 96372 THER/PROPH/DIAG INJ SC/IM: CPT | Mod: ,,, | Performed by: INTERNAL MEDICINE

## 2025-02-10 PROCEDURE — 84443 ASSAY THYROID STIM HORMONE: CPT | Mod: ,,, | Performed by: CLINICAL MEDICAL LABORATORY

## 2025-02-10 PROCEDURE — 99214 OFFICE O/P EST MOD 30 MIN: CPT | Mod: ,,, | Performed by: INTERNAL MEDICINE

## 2025-02-10 RX ORDER — CYANOCOBALAMIN 1000 UG/ML
1000 INJECTION, SOLUTION INTRAMUSCULAR; SUBCUTANEOUS
Status: COMPLETED | OUTPATIENT
Start: 2025-02-10 | End: 2025-02-10

## 2025-02-10 RX ADMIN — CYANOCOBALAMIN 1000 MCG: 1000 INJECTION, SOLUTION INTRAMUSCULAR; SUBCUTANEOUS at 09:02

## 2025-02-10 NOTE — PROGRESS NOTES
Subjective:       Patient ID: Praful Esposito III is a 74 y.o. male.    Chief Complaint: Follow-up (6m fu/Low energy)    The patient is a 71-year-old white male the presents today for follow-up.  History of GERD and BPH.  At his last visit he complained of cough and wheezing.  Chest x-ray showed no acute process.  COVID swab was negative.  We prescribed him a Ventolin inhaler and his cough has resolved.  No complaints today.  His systolic blood pressure is  Elevated.  No history of hypertension.  He is currently resting comfortably in no distress.  He is afebrile and other than systolic blood pressure vital signs are stable.  He states systolic blood pressure normally runs in the 130s to 140s.      02/10/2025-Mr. Esposito is a 74-year-old male the presents today for follow-up.  He is up-to-date on age-appropriate health screenings.  Last colonoscopy was done Carole 15, 2023 with recommendations for repeat in 3 years.  Blood pressure looks better today.  It is 132/78.  He has not had any further syncopal episodes.  He denies any palpitations, chest pain, or shortness a breath.  He continues to work out with a  2 times per week.  States that his strength is improving.  His endurance and stamina is not what it used to be.  Otherwise he is resting comfortably in no distress.  He is afebrile and vital signs are stable.    Follow-up  Associated symptoms include arthralgias. Pertinent negatives include no abdominal pain, chest pain, chills, coughing, fatigue, fever, headaches, joint swelling, myalgias, nausea, neck pain, rash, sore throat, vomiting or weakness.     Review of Systems   Constitutional:  Negative for activity change, appetite change, chills, fatigue, fever and unexpected weight change.   HENT:  Negative for ear pain, hearing loss, rhinorrhea, sinus pressure/congestion, sore throat and trouble swallowing.    Eyes:  Negative for pain, discharge, redness and visual disturbance.   Respiratory:  Negative for  apnea, cough, chest tightness, shortness of breath and wheezing.    Cardiovascular:  Negative for chest pain, palpitations and leg swelling.   Gastrointestinal:  Negative for abdominal pain, blood in stool, constipation, diarrhea, nausea and vomiting.   Endocrine: Negative for cold intolerance, heat intolerance, polydipsia and polyuria.   Genitourinary:  Negative for difficulty urinating, dysuria, hematuria and urgency.   Musculoskeletal:  Positive for arthralgias. Negative for back pain, joint swelling, myalgias and neck pain.   Integumentary:  Negative for pallor and rash.   Allergic/Immunologic: Negative for frequent infections.   Neurological:  Negative for dizziness, tremors, seizures, weakness and headaches.   Hematological:  Negative for adenopathy.   Psychiatric/Behavioral:  Negative for confusion, dysphoric mood and sleep disturbance. The patient is not nervous/anxious.          Objective:      Physical Exam  Vitals and nursing note reviewed.   Constitutional:       General: He is not in acute distress.     Appearance: Normal appearance. He is not ill-appearing.   HENT:      Head: Normocephalic and atraumatic.      Right Ear: External ear normal.      Left Ear: External ear normal.      Nose: Nose normal.      Mouth/Throat:      Pharynx: Oropharynx is clear.   Eyes:      Extraocular Movements: Extraocular movements intact.      Conjunctiva/sclera: Conjunctivae normal.      Pupils: Pupils are equal, round, and reactive to light.   Neck:      Vascular: No carotid bruit.   Cardiovascular:      Rate and Rhythm: Normal rate and regular rhythm.      Pulses: Normal pulses.      Heart sounds: Normal heart sounds. No murmur heard.  Pulmonary:      Effort: No respiratory distress.      Breath sounds: No wheezing or rales.   Abdominal:      General: Bowel sounds are normal. There is no distension.      Palpations: Abdomen is soft.   Musculoskeletal:         General: Normal range of motion.      Cervical back: Normal  range of motion and neck supple.      Right lower leg: No edema.      Left lower leg: No edema.   Skin:     General: Skin is warm and dry.      Capillary Refill: Capillary refill takes less than 2 seconds.      Coloration: Skin is not pale.   Neurological:      General: No focal deficit present.      Mental Status: He is alert and oriented to person, place, and time.      Cranial Nerves: No cranial nerve deficit.      Sensory: No sensory deficit.      Motor: No weakness.   Psychiatric:         Mood and Affect: Mood normal.         Judgment: Judgment normal.         Assessment:       1. Decreased energy    2. Primary hypertension    3. Benign prostatic hyperplasia, unspecified whether lower urinary tract symptoms present    4. Gastroesophageal reflux disease, unspecified whether esophagitis present    5. DINO (obstructive sleep apnea)        Plan:       1. The patient presents today for follow-up.    He had his colonoscopy done in June 2023.  He had some diverticula and 4 polyps which were removed.  Recommendations were for repeat in 3 years.  We will place referral for AAA screening.    2. Hearing loss  he did have hearing testing done.  Currently no acute issues    3. Elevated blood pressure reading-no diagnosis hypertension.  Blood pressure today  132/78    4. BPH-stable.  PSA 7.02 down from 8.96.  This is stable He follows with Urology--biopsy and MRI both negative    5. GERD-stable on Nexium-    6. Rash-comes and goes.  On the face.  Rosacea?  He has an appointment scheduled with Dermatology in October.  3/2/23-he had follow-up with Dermatology in his issues have resolved    7. Erectile dysfunction-testosterone is in normal range but towards the bottom of normal.  Tadalafil p.r.n..  He has inquired about hormone replacement therapy.  I will do some research and get back with a  9/11/23-he started a DHEA supplement over-the-counter and this has improved his symptoms.  Given his normal testosterone level no  indications for testosterone replacement  2/10/25-we are going to repeat a testosterone level today    8. Near-syncope-2  Sounds more like vasovagal syncope or orthostasis.  We did check orthostatic vitals and he does have a 10 mm of mercury drop diastolic.  This certainly could be contributing.  We have discussed changing positions slowly and carefully.  He is mildly bradycardic today as well.  If he has another issue we are going to order a stress test for him.  7/23/24-he continues to have some episodes where he feels dizzy and near syncopal.  Mostly occur when he is exerting himself and bending over and raising back up.  We are going to have him wear a Holter monitor for 48 hours.  He should have this on while he is doing his workout.  2/10/25-doing better with no recent issues    9. Decreased energy level-likely multifactorial.  We are going to check a TSH, free T4, testosterone level.  We are going to give him a B12 injection to see if that will help.  If it does we can set him up for monthly injections.    Billing for this encounter is based on  moderate level of medical decision-making    Return to care in 6 months or sooner if needed.

## 2025-02-20 ENCOUNTER — RESULTS FOLLOW-UP (OUTPATIENT)
Dept: INTERNAL MEDICINE | Facility: CLINIC | Age: 75
End: 2025-02-20
Payer: MEDICARE

## 2025-05-29 RX ORDER — TRIAMCINOLONE ACETONIDE 1 MG/G
CREAM TOPICAL 2 TIMES DAILY
Qty: 454 G | Refills: 0 | OUTPATIENT
Start: 2025-05-29

## 2025-07-15 ENCOUNTER — TELEPHONE (OUTPATIENT)
Dept: UROLOGY | Facility: CLINIC | Age: 75
End: 2025-07-15
Payer: MEDICARE

## 2025-07-15 NOTE — TELEPHONE ENCOUNTER
Patient requesting an appointment with urology. Informed him he has an appointment with KENNY Mayer NP on 7/18/25 at 0830.  He voiced understanding.

## 2025-07-16 NOTE — PROGRESS NOTES
Subjective     Patient ID: Praful Esposito III is a 75 y.o. male.    Chief Complaint:  Follow-up of chronic elevated PSA and lab    This 75 year old male presents to the clinic for follow up of chronic PSA elevation, chronic prostatitis and BPH with LUTS.  Patient states he is doing good.  He is a long standing patient of Dr. MYLES Baldwin.  His PSA was 7.104 on February 10, 2025 and within his baseline PSAs.  He is  mixed  with the way he voids.  His IPSS score is 11 that reflects incomplete bladder emptying, intermittency, weak stream, and nocturia 2 times a night.  His PVR was 000 MLS.  He denies dysuria, hematuria, frequency, urgency, or straining to urinate.  He denies fever, chills, nausea or vomiting.  He denies abdominal, bladder or back pain. Records indicates he had a MRI of prostate at [facility] in September 2017 and biopsies done in 2015. His MRI of the Prostate done on December 9, 2021 showed a PI-RADS version 2.1 score: 2 and Findings of BPH.  I discussed continued surveillance of the PSA and repeating the MRI of the prostate if indicated.  I discussed adding an alpha-blocker for voiding symptoms.  I discussed doing the BPH workup for possible TURP or other prostate procedure.  He reports asymptomatic bradycardia and states it runs in his family.  He had no complaint of his erectile dysfunction at today's visit.  He is on Cialis 10 mg as needed and states he is tolerating this medicine without side effects.  I discussed the risks of Cialis and bradycardia.  Through shared decision-making with the patient, he desires to continue to monitor the PSA every 6 months.  He desires not to add medication or workup for voiding symptoms at this time but will consider it in the future.  He desires to continue the Cialis 10 mg as prescribed.  I will plan to see the patient back in the clinic in 6 months with another PSA.  I discussed the plan in detail with the patient and he is in agreement with the plan.  All his  questions were answered at today's visit.  I spent 30 minutes counseling this patient, reviewing the chart, imaging and labs.    PSA History:   PSA was 9.130 on November 16, 2021  PSA was 8.640 on June 13, 2022  PSA was 8.630 on December 13, 2022  PSA was 8.960 on June 13, 2023  PSA was 7.360 on December 13, 2023   PSA was 7.020 on July 17, 2024  PSA was 7.104 on February 10, 2025  ------------------------------------------------------------------------------------------------------------------  [July 18, 2025].        Review of Systems   Constitutional:  Negative for activity change and fever.   HENT:  Negative for hearing loss and trouble swallowing.    Eyes:  Negative for visual disturbance.   Respiratory:  Negative for cough, shortness of breath and wheezing.    Cardiovascular:  Negative for chest pain.   Gastrointestinal:  Negative for abdominal pain, diarrhea, nausea and vomiting.   Endocrine: Negative for polyuria.   Genitourinary:  Negative for bladder incontinence, decreased urine volume, difficulty urinating, discharge, dysuria, enuresis, erectile dysfunction, flank pain, frequency, genital sores, hematuria, penile pain, penile swelling, scrotal swelling, testicular pain and urgency.        Chronic elevated PSA       Dysplasia prostate       Chronic prostatitis       Bladder neck obstruction   Musculoskeletal:  Negative for back pain and gait problem.   Integumentary:  Negative for rash.   Neurological:  Negative for speech difficulty and weakness.   Psychiatric/Behavioral:  Negative for behavioral problems and confusion.         Objective     Physical Exam  Constitutional:       General: He is not in acute distress.     Appearance: Normal appearance. He is not ill-appearing, toxic-appearing or diaphoretic.   HENT:      Head: Normocephalic.   Eyes:      Extraocular Movements: Extraocular movements intact.   Cardiovascular:      Rate and Rhythm: Regular rhythm. Bradycardia present.      Heart sounds: Normal  heart sounds.      Comments: Asymptomatic bradycardia  Pulmonary:      Effort: Pulmonary effort is normal. No respiratory distress.      Breath sounds: Normal breath sounds. No wheezing, rhonchi or rales.   Abdominal:      General: Bowel sounds are normal.      Palpations: Abdomen is soft.      Tenderness: There is no abdominal tenderness. There is no right CVA tenderness, left CVA tenderness, guarding or rebound.   Genitourinary:     Rectum: Normal.      Comments: EMILIANO: > 30 g, smooth, symmetrical, nontender, non indurated  Musculoskeletal:         General: Normal range of motion.      Cervical back: Normal range of motion. No rigidity.   Skin:     General: Skin is warm and dry.   Neurological:      General: No focal deficit present.      Mental Status: He is alert and oriented to person, place, and time.      Motor: No weakness.      Coordination: Coordination normal.      Gait: Gait normal.   Psychiatric:         Mood and Affect: Mood normal.         Behavior: Behavior normal.         Thought Content: Thought content normal.        Assessment and Plan     1. Elevated PSA  -     PSA, Total (Diagnostic); Future; Expected date: 01/18/2026    2. Dysplasia of prostate    3. Chronic prostatitis    4. Bladder neck obstruction             PSA in 6 months  Patient desires no further intervention for voiding symptoms at this time   Continue Cialis 10 mg as prescribed   Follow up with Urology NP KENNY Leiva in 6 months

## 2025-07-18 ENCOUNTER — OFFICE VISIT (OUTPATIENT)
Dept: UROLOGY | Facility: CLINIC | Age: 75
End: 2025-07-18
Payer: MEDICARE

## 2025-07-18 VITALS
DIASTOLIC BLOOD PRESSURE: 76 MMHG | SYSTOLIC BLOOD PRESSURE: 180 MMHG | OXYGEN SATURATION: 97 % | HEART RATE: 50 BPM | WEIGHT: 186.63 LBS | BODY MASS INDEX: 28.28 KG/M2 | HEIGHT: 68 IN

## 2025-07-18 DIAGNOSIS — N41.1 CHRONIC PROSTATITIS: Chronic | ICD-10-CM

## 2025-07-18 DIAGNOSIS — N42.30 DYSPLASIA OF PROSTATE: Chronic | ICD-10-CM

## 2025-07-18 DIAGNOSIS — N32.0 BLADDER NECK OBSTRUCTION: Chronic | ICD-10-CM

## 2025-07-18 DIAGNOSIS — R97.20 ELEVATED PSA: Primary | Chronic | ICD-10-CM

## 2025-07-18 PROCEDURE — 99214 OFFICE O/P EST MOD 30 MIN: CPT | Mod: PBBFAC | Performed by: NURSE PRACTITIONER

## 2025-07-18 PROCEDURE — 99214 OFFICE O/P EST MOD 30 MIN: CPT | Mod: S$PBB,,, | Performed by: NURSE PRACTITIONER

## 2025-07-18 PROCEDURE — 99999 PR PBB SHADOW E&M-EST. PATIENT-LVL IV: CPT | Mod: PBBFAC,,, | Performed by: NURSE PRACTITIONER

## 2025-07-18 NOTE — PATIENT INSTRUCTIONS
PSA in 6 months  Patient desires no further intervention for voiding symptoms at this time   Continue Cialis 10 mg as prescribed   Follow up with Urology NP KENNY Leiva in 6 months

## 2025-07-21 RX ORDER — ESOMEPRAZOLE MAGNESIUM 40 MG/1
40 CAPSULE, DELAYED RELEASE ORAL
Qty: 90 CAPSULE | Refills: 3 | Status: SHIPPED | OUTPATIENT
Start: 2025-07-21

## 2025-09-05 ENCOUNTER — TELEPHONE (OUTPATIENT)
Dept: FAMILY MEDICINE | Facility: CLINIC | Age: 75
End: 2025-09-05
Payer: MEDICARE

## (undated) DEVICE — SUT 4-0 VICRYL / FS-2

## (undated) DEVICE — NDL HYPODERMIC SAFETY 25G 1IN

## (undated) DEVICE — SPONGE COTTON WOVEN 4X4IN

## (undated) DEVICE — GLOVE BIOGEL SKINSENSE PI 6.5

## (undated) DEVICE — PADDING WYTEX UNDRCST 2INX4YD

## (undated) DEVICE — GLOVE PROTEXIS BLUE LATEX 7

## (undated) DEVICE — GLOVE BIOGEL SKINSENSE PI 8.0

## (undated) DEVICE — Device

## (undated) DEVICE — SOL NACL IRR 1000ML BTL

## (undated) DEVICE — BANDAGE MATRIX HK LOOP 2IN 5YD

## (undated) DEVICE — GLOVE BIOGEL SKINSENSE PI 7.0

## (undated) DEVICE — SUT ETHILON 4-0 PS2 18 BLK

## (undated) DEVICE — SYR 10CC LUER LOCK

## (undated) DEVICE — GLOVE PROTEXIS BLUE LATEX 8

## (undated) DEVICE — ELECTRODE REM POLYHESIVE II

## (undated) DEVICE — SOCKINETTE DOUBLE PLY 4X48IN

## (undated) DEVICE — GLOVE 8 PROTEXIS PI BLUE

## (undated) DEVICE — DECANTER FLUID TRNSF WHITE 9IN

## (undated) DEVICE — APPLICATOR CHLORAPREP ORN 26ML

## (undated) DEVICE — GLOVE 7.0 PROTEXIS PI BLUE

## (undated) DEVICE — GLOVE 6.5 PROTEXIS PI BLUE

## (undated) DEVICE — SEE L#120831

## (undated) DEVICE — GLOVE BIOGEL SKINSENSE PI 7.5

## (undated) DEVICE — GOWN POLY REINF BRTH SLV XL

## (undated) DEVICE — GLOVE PROTEXIS BLUE LATEX 6.5